# Patient Record
Sex: MALE | Race: OTHER | Employment: FULL TIME | URBAN - METROPOLITAN AREA
[De-identification: names, ages, dates, MRNs, and addresses within clinical notes are randomized per-mention and may not be internally consistent; named-entity substitution may affect disease eponyms.]

---

## 2018-09-13 PROBLEM — K51.20 ULCERATIVE PROCTITIS WITHOUT COMPLICATION (HCC): Status: ACTIVE | Noted: 2018-09-13

## 2018-09-13 PROBLEM — Z86.010 PERSONAL HISTORY OF COLONIC POLYPS: Status: ACTIVE | Noted: 2018-09-13

## 2018-09-13 PROBLEM — Z86.0100 PERSONAL HISTORY OF COLONIC POLYPS: Status: ACTIVE | Noted: 2018-09-13

## 2018-10-16 ENCOUNTER — NURSE ONLY (OUTPATIENT)
Dept: ENDOCRINOLOGY CLINIC | Facility: CLINIC | Age: 43
End: 2018-10-16
Payer: COMMERCIAL

## 2018-10-16 DIAGNOSIS — E11.65 TYPE 2 DIABETES MELLITUS WITH HYPERGLYCEMIA, WITHOUT LONG-TERM CURRENT USE OF INSULIN (HCC): Primary | ICD-10-CM

## 2018-10-16 PROCEDURE — G0108 DIAB MANAGE TRN  PER INDIV: HCPCS

## 2018-10-18 NOTE — PROGRESS NOTES
Lorena Walden  : 1975 attended Step 1 Diabetic Education:    Date: 10/18/2018   Start time: 6pm End time: 7pm    BP (P) 115/66   Ht (P) 71\"   Wt (P) 168 lb   BMI (P) 23.43 kg/m²     No results found for: A1C     Depression Screen -  1    Diabete

## 2018-10-31 ENCOUNTER — NURSE ONLY (OUTPATIENT)
Dept: ENDOCRINOLOGY CLINIC | Facility: CLINIC | Age: 43
End: 2018-10-31
Payer: COMMERCIAL

## 2018-10-31 VITALS — BODY MASS INDEX: 24 KG/M2 | WEIGHT: 171 LBS

## 2018-10-31 DIAGNOSIS — E11.65 TYPE 2 DIABETES MELLITUS WITH HYPERGLYCEMIA, WITHOUT LONG-TERM CURRENT USE OF INSULIN (HCC): Primary | ICD-10-CM

## 2018-10-31 PROCEDURE — G0109 DIAB MANAGE TRN IND/GROUP: HCPCS

## 2018-11-01 NOTE — PROGRESS NOTES
Michael Reid  [id number] attended Step 2 Diabetic Education:All done in Hungarian    Date: 11/1/2018  Start time: 2:30 End time: 3:30p      Diabetes Overview, pathophysiology, pre-diabetes, A1C results and treatment options for diabetes self-management,

## 2018-11-28 ENCOUNTER — NURSE ONLY (OUTPATIENT)
Dept: ENDOCRINOLOGY CLINIC | Facility: CLINIC | Age: 43
End: 2018-11-28
Payer: COMMERCIAL

## 2018-11-28 VITALS — BODY MASS INDEX: 24.08 KG/M2 | WEIGHT: 172 LBS | HEIGHT: 71 IN

## 2018-11-28 DIAGNOSIS — E11.65 TYPE 2 DIABETES MELLITUS WITH HYPERGLYCEMIA, WITHOUT LONG-TERM CURRENT USE OF INSULIN (HCC): Primary | ICD-10-CM

## 2018-11-28 PROCEDURE — G0108 DIAB MANAGE TRN  PER INDIV: HCPCS

## 2018-11-30 ENCOUNTER — OFFICE VISIT (OUTPATIENT)
Dept: INTERNAL MEDICINE CLINIC | Facility: CLINIC | Age: 43
End: 2018-11-30
Payer: COMMERCIAL

## 2018-11-30 VITALS
RESPIRATION RATE: 16 BRPM | BODY MASS INDEX: 25.14 KG/M2 | HEIGHT: 68.75 IN | TEMPERATURE: 98 F | DIASTOLIC BLOOD PRESSURE: 80 MMHG | HEART RATE: 68 BPM | SYSTOLIC BLOOD PRESSURE: 110 MMHG | WEIGHT: 169.75 LBS

## 2018-11-30 DIAGNOSIS — F39 MOOD DISORDER (HCC): ICD-10-CM

## 2018-11-30 DIAGNOSIS — F17.201 TOBACCO DEPENDENCE IN REMISSION: ICD-10-CM

## 2018-11-30 DIAGNOSIS — K51.20 ULCERATIVE PROCTITIS WITHOUT COMPLICATION (HCC): Primary | ICD-10-CM

## 2018-11-30 DIAGNOSIS — E11.9 TYPE 2 DIABETES MELLITUS WITHOUT COMPLICATION, WITHOUT LONG-TERM CURRENT USE OF INSULIN (HCC): ICD-10-CM

## 2018-11-30 PROCEDURE — 90686 IIV4 VACC NO PRSV 0.5 ML IM: CPT | Performed by: INTERNAL MEDICINE

## 2018-11-30 PROCEDURE — 99204 OFFICE O/P NEW MOD 45 MIN: CPT | Performed by: INTERNAL MEDICINE

## 2018-11-30 PROCEDURE — 90471 IMMUNIZATION ADMIN: CPT | Performed by: INTERNAL MEDICINE

## 2018-11-30 NOTE — PATIENT INSTRUCTIONS
John Williamson en rocio del comportamiento es [place]. Teléfono: [phone]. Póngase en contacto con rosanna si no tiene noticias de rosanna en los próximos 3 mina. Rosanna no habla español neva puedes ayudarte con recursos.

## 2018-11-30 NOTE — PROGRESS NOTES
Claudia Gutierrez is a 37year old male. HPI:   Patient presents to establish care. He has DM. Comes in with wife. 1. Ulcerative Proctitis: doing very well on mesalamine.  Denies diarrhea, constipation, melena, hematochezia, n/v.   2. DM: diagnosed in BMI 25.25 kg/m²   GENERAL: A&O well developed, well nourished,in no apparent distress  SKIN: no rashes,no suspicious lesions  HEENT: atraumatic, MMM, throat is clear  NECK: supple, no jvd, no thyromegaly  LUNGS: clear to auscultation bilateraly, no c/w/r patient is asked to return in 3 months for CPX.   Victorino Delgado MD

## 2018-12-10 ENCOUNTER — TELEPHONE (OUTPATIENT)
Dept: INTERNAL MEDICINE CLINIC | Facility: CLINIC | Age: 43
End: 2018-12-10

## 2019-01-07 DIAGNOSIS — E11.9 TYPE 2 DIABETES MELLITUS WITHOUT COMPLICATION, WITHOUT LONG-TERM CURRENT USE OF INSULIN (HCC): Primary | ICD-10-CM

## 2019-01-07 DIAGNOSIS — K51.20 ULCERATIVE PROCTITIS WITHOUT COMPLICATION (HCC): ICD-10-CM

## 2019-01-07 NOTE — TELEPHONE ENCOUNTER
Refill requested:   Requested Prescriptions     Pending Prescriptions Disp Refills   • MetFORMIN HCl 500 MG Oral Tab 90 tablet 0     Sig: Take 1 tablet (500 mg total) by mouth daily with breakfast.       Failed protocol    Diabetic Medication Protocol Fail Classes and/or appointments are held at the [facility] located at [address], [address]  If you need to make changes to your appointment or have questions, please call the Reece Martinez 's license/ID, the order for education from your doctor, any pertinent lab results and a list of medications patient is taking. Should you have questions about insurance coverage, call the [phone] number on the back of your insurance card.     Mar 01, 2

## 2019-01-09 NOTE — TELEPHONE ENCOUNTER
Spoke to pt and informed to complete fasting labs. Informed we have not received labs from previous PCP and Dr Jose Snowden is in need of labs to renew rx. Pt states will go tomorrow and needs refill to [facility].     Pt also looking for an appt for tomorrow for a c

## 2019-01-10 ENCOUNTER — TELEPHONE (OUTPATIENT)
Dept: INTERNAL MEDICINE CLINIC | Facility: CLINIC | Age: 44
End: 2019-01-10

## 2019-01-10 ENCOUNTER — OFFICE VISIT (OUTPATIENT)
Dept: INTERNAL MEDICINE CLINIC | Facility: CLINIC | Age: 44
End: 2019-01-10
Payer: COMMERCIAL

## 2019-01-10 VITALS
TEMPERATURE: 98 F | BODY MASS INDEX: 25.73 KG/M2 | RESPIRATION RATE: 16 BRPM | DIASTOLIC BLOOD PRESSURE: 80 MMHG | HEIGHT: 68.75 IN | HEART RATE: 64 BPM | SYSTOLIC BLOOD PRESSURE: 108 MMHG | WEIGHT: 173.75 LBS

## 2019-01-10 DIAGNOSIS — E66.3 OVERWEIGHT (BMI 25.0-29.9): ICD-10-CM

## 2019-01-10 DIAGNOSIS — J06.9 UPPER RESPIRATORY INFECTION, ACUTE: Primary | ICD-10-CM

## 2019-01-10 DIAGNOSIS — K51.20 ULCERATIVE PROCTITIS WITHOUT COMPLICATION (HCC): ICD-10-CM

## 2019-01-10 DIAGNOSIS — E11.9 TYPE 2 DIABETES MELLITUS WITHOUT COMPLICATION, WITHOUT LONG-TERM CURRENT USE OF INSULIN (HCC): ICD-10-CM

## 2019-01-10 PROCEDURE — 99214 OFFICE O/P EST MOD 30 MIN: CPT | Performed by: INTERNAL MEDICINE

## 2019-01-10 RX ORDER — CODEINE PHOSPHATE AND GUAIFENESIN 10; 100 MG/5ML; MG/5ML
5 SOLUTION ORAL NIGHTLY PRN
Qty: 180 ML | Refills: 0 | Status: SHIPPED | OUTPATIENT
Start: 2019-01-10 | End: 2019-02-07

## 2019-01-10 RX ORDER — AZITHROMYCIN 250 MG/1
TABLET, FILM COATED ORAL
Qty: 6 TABLET | Refills: 0 | Status: SHIPPED | OUTPATIENT
Start: 2019-01-10 | End: 2019-02-07

## 2019-01-10 NOTE — PATIENT INSTRUCTIONS
- Start cough syrup. This medication can make you drowsy, so take it at night only. - If you are not better in 2 days, start antibiotics (azithromycin). It was a pleasure seeing you in the clinic today.   Thank you for choosing the [facility]

## 2019-01-10 NOTE — TELEPHONE ENCOUNTER
PT needs letter from  of medical necessity for 10.16.18, 10.31.18 and 11.28.18 visits for Disease Management Ed.     Please call patient with any questions

## 2019-01-10 NOTE — PROGRESS NOTES
Yaneli Atkins is a 37year old male. HPI:   Patient presents with:  Cough: x 1 week. Has not tried anything OTC. Dtr and spouse sick at home. States cough sounds like there is some phlegm but he has not brought anything up.    Sore Throat  Patient presabdullahi Hyperlipidemia. Surgical:  has a past surgical history that includes colonoscopy and hernia surgery (1992). Family: family history includes Cancer in his mother, paternal grandmother, and sister; Diabetes in his mother.   Social:  reports that he quit smo On mesalamine. 4. Overweight (BMI 25.0-29.9)  Up 4 lbs from last visit. Focus on lifestyle modification.     Patient Care Team:  Sonya Clay MD as PCP - General (Family Medicine)  The patient indicates understanding of these issues and agrees to the

## 2019-01-11 NOTE — PROGRESS NOTES
Lorena Walden  :1975 attended Step 4 Diabetic Education:    Date: 2019 Start time: 4pm End time: 5pm      Ht 71\"   Wt 172 lb   BMI 23.99 kg/m²       No results found for: A1C   (HbA1c reference range:</= 7%)    Healthy Eating:  Accomplishm ndep.nih.gov   NDIC or National Diabetes Information Clearinghouse website: diabetes. niddk.nih.gov   Other:      Patient verbalized understanding and has no further questions at this time. Written materials provided for all areas covered.     Fide Dalton

## 2019-02-05 ENCOUNTER — APPOINTMENT (OUTPATIENT)
Dept: LAB | Age: 44
End: 2019-02-05
Attending: INTERNAL MEDICINE
Payer: COMMERCIAL

## 2019-02-05 ENCOUNTER — NURSE ONLY (OUTPATIENT)
Dept: ENDOCRINOLOGY CLINIC | Facility: CLINIC | Age: 44
End: 2019-02-05
Payer: COMMERCIAL

## 2019-02-05 VITALS — WEIGHT: 173 LBS | HEIGHT: 68.75 IN | BODY MASS INDEX: 25.62 KG/M2

## 2019-02-05 DIAGNOSIS — E11.9 TYPE 2 DIABETES MELLITUS WITHOUT COMPLICATION, WITHOUT LONG-TERM CURRENT USE OF INSULIN (HCC): ICD-10-CM

## 2019-02-05 DIAGNOSIS — E11.65 TYPE 2 DIABETES MELLITUS WITH HYPERGLYCEMIA, WITHOUT LONG-TERM CURRENT USE OF INSULIN (HCC): Primary | ICD-10-CM

## 2019-02-05 DIAGNOSIS — K51.20 ULCERATIVE PROCTITIS WITHOUT COMPLICATION (HCC): ICD-10-CM

## 2019-02-05 LAB
ALT SERPL-CCNC: 20 U/L (ref 17–63)
ANION GAP SERPL CALC-SCNC: 5 MMOL/L (ref 0–18)
AST SERPL-CCNC: 9 U/L (ref 15–41)
BUN BLD-MCNC: 13 MG/DL (ref 8–20)
BUN/CREAT SERPL: 13.7 (ref 10–20)
CALCIUM BLD-MCNC: 8.6 MG/DL (ref 8.3–10.3)
CHLORIDE SERPL-SCNC: 107 MMOL/L (ref 101–111)
CHOLEST SMN-MCNC: 108 MG/DL (ref ?–200)
CO2 SERPL-SCNC: 28 MMOL/L (ref 22–32)
CREAT BLD-MCNC: 0.95 MG/DL (ref 0.7–1.3)
CREAT UR-SCNC: 47.9 MG/DL
EST. AVERAGE GLUCOSE BLD GHB EST-MCNC: 137 MG/DL (ref 68–126)
GLUCOSE BLD-MCNC: 117 MG/DL (ref 70–99)
HBA1C MFR BLD HPLC: 6.4 % (ref ?–5.7)
HDLC SERPL-MCNC: 47 MG/DL (ref 40–59)
LDLC SERPL CALC-MCNC: 49 MG/DL (ref ?–100)
MICROALBUMIN UR-MCNC: <0.5 MG/DL
NONHDLC SERPL-MCNC: 61 MG/DL (ref ?–130)
OSMOLALITY SERPL CALC.SUM OF ELEC: 291 MOSM/KG (ref 275–295)
POTASSIUM SERPL-SCNC: 4.3 MMOL/L (ref 3.6–5.1)
SODIUM SERPL-SCNC: 140 MMOL/L (ref 136–144)
TRIGL SERPL-MCNC: 60 MG/DL (ref 30–149)
VLDLC SERPL CALC-MCNC: 12 MG/DL (ref 0–30)

## 2019-02-05 PROCEDURE — 80048 BASIC METABOLIC PNL TOTAL CA: CPT | Performed by: INTERNAL MEDICINE

## 2019-02-05 PROCEDURE — 84450 TRANSFERASE (AST) (SGOT): CPT | Performed by: INTERNAL MEDICINE

## 2019-02-05 PROCEDURE — 84460 ALANINE AMINO (ALT) (SGPT): CPT | Performed by: INTERNAL MEDICINE

## 2019-02-05 PROCEDURE — 36415 COLL VENOUS BLD VENIPUNCTURE: CPT | Performed by: INTERNAL MEDICINE

## 2019-02-05 PROCEDURE — G0108 DIAB MANAGE TRN  PER INDIV: HCPCS

## 2019-02-05 PROCEDURE — 82043 UR ALBUMIN QUANTITATIVE: CPT | Performed by: INTERNAL MEDICINE

## 2019-02-05 PROCEDURE — 83036 HEMOGLOBIN GLYCOSYLATED A1C: CPT | Performed by: INTERNAL MEDICINE

## 2019-02-05 PROCEDURE — 80061 LIPID PANEL: CPT | Performed by: INTERNAL MEDICINE

## 2019-02-05 PROCEDURE — 82570 ASSAY OF URINE CREATININE: CPT | Performed by: INTERNAL MEDICINE

## 2019-02-06 NOTE — PROGRESS NOTES
Colette Gonzalez  : 1975 was seen for Diabetic Education Follow up:    Date: 2019   Start time: 5:30p End time: 6pm    Assessment:     Assessment:  Ht 68.75\"   Wt 173 lb   BMI 25.73 kg/m²      HgbA1C (%)   Date Value   2019 6.4 (H) HEMOGLOBIN A1C   Result Value Ref Range    HgbA1C 6.4 (H) <5.7 %    Estimated Average Glucose 137 (H) 68 - 126 mg/dL   LIPID PANEL   Result Value Ref Range    Cholesterol, Total 108 <200 mg/dL    HDL Cholesterol 47 40 - 59 mg/dL    Triglycerides 60 30 -

## 2019-02-07 ENCOUNTER — OFFICE VISIT (OUTPATIENT)
Dept: INTERNAL MEDICINE CLINIC | Facility: CLINIC | Age: 44
End: 2019-02-07
Payer: COMMERCIAL

## 2019-02-07 VITALS
RESPIRATION RATE: 13 BRPM | HEIGHT: 68.75 IN | WEIGHT: 166 LBS | SYSTOLIC BLOOD PRESSURE: 112 MMHG | HEART RATE: 88 BPM | OXYGEN SATURATION: 97 % | BODY MASS INDEX: 24.59 KG/M2 | TEMPERATURE: 99 F | DIASTOLIC BLOOD PRESSURE: 80 MMHG

## 2019-02-07 DIAGNOSIS — R19.7 DIARRHEA, UNSPECIFIED TYPE: Primary | ICD-10-CM

## 2019-02-07 PROCEDURE — 99213 OFFICE O/P EST LOW 20 MIN: CPT | Performed by: NURSE PRACTITIONER

## 2019-02-07 NOTE — PROGRESS NOTES
Patient presents with:  Diarrhea: x1 day. has gone x6 times/ liquid stools    The patient complaints of abdominal cramping. Pain is located at Generalized. Pain is described as cramping. Severity is mild.  Associated symptoms: increased belching and diarrh SURGERY  1992      Family History   Problem Relation Age of Onset   • Diabetes Mother    • Cancer Mother         uterine cancer   • Cancer Sister         breast cancer   • Cancer Paternal Grandmother         ? cancer      Social History:  Social History may drink Pedialyte which is lower in sugar than Gatorade.    Check labs: Not at his time  Diagnostic tests: not at this time  Medications: imodium OTC, discussed mark with pt follow instructions on medication to use after stools    The patient indicates und

## 2019-02-07 NOTE — PATIENT INSTRUCTIONS
Loperamide oral suspension  Brand Name: Imodium A-D  ¿Qué es robinson medicamento? La LOPERAMIDA se utiliza para tratar [employer]. ¿Cómo corine Inavale Gracie?   [place]om robinson medicamento por vía oral. Siga las instrucciones de la etiqueta del medicam cimetidina claritromicina eritromicina gemfibrozil itraconazol ketoconazol quinidina quinina ranitidina ritonavir saquinavir  ¿Qué sucede si me olvido de maría dosis? No se aplica en robinson michell. Esta medicina no es para uso regular.  Yahoo solame Puede experimentar somnolencia o mareos. No conduzca, no utilice maquinaria ni akira nada que le exija permanecer en estado de alerta hasta que sepa cómo le afecta robinson medicamento.  No se siente ni se ponga de pie con rapidez, especialmente si es un pacient · Iona abundante cantidad de líquidos para evitar la deshidratación (pérdida excesiva de líquido). El agua, los caldos eulalia y las soluciones con electrolitos son [place] opciones.  Evite las bebidas alcohólicas, el café, el té y [address], ya que pueden irr

## 2019-03-01 ENCOUNTER — OFFICE VISIT (OUTPATIENT)
Dept: INTERNAL MEDICINE CLINIC | Facility: CLINIC | Age: 44
End: 2019-03-01
Payer: COMMERCIAL

## 2019-03-01 VITALS
HEART RATE: 78 BPM | HEIGHT: 68.75 IN | BODY MASS INDEX: 26.18 KG/M2 | DIASTOLIC BLOOD PRESSURE: 64 MMHG | RESPIRATION RATE: 14 BRPM | OXYGEN SATURATION: 98 % | TEMPERATURE: 98 F | SYSTOLIC BLOOD PRESSURE: 110 MMHG | WEIGHT: 176.75 LBS

## 2019-03-01 DIAGNOSIS — E11.9 TYPE 2 DIABETES MELLITUS WITHOUT COMPLICATION, WITHOUT LONG-TERM CURRENT USE OF INSULIN (HCC): ICD-10-CM

## 2019-03-01 DIAGNOSIS — Z00.00 ROUTINE GENERAL MEDICAL EXAMINATION AT A HEALTH CARE FACILITY: Primary | ICD-10-CM

## 2019-03-01 PROCEDURE — 90715 TDAP VACCINE 7 YRS/> IM: CPT | Performed by: INTERNAL MEDICINE

## 2019-03-01 PROCEDURE — 90471 IMMUNIZATION ADMIN: CPT | Performed by: INTERNAL MEDICINE

## 2019-03-01 PROCEDURE — 99396 PREV VISIT EST AGE 40-64: CPT | Performed by: INTERNAL MEDICINE

## 2019-03-01 RX ORDER — ATORVASTATIN CALCIUM 20 MG/1
20 TABLET, FILM COATED ORAL NIGHTLY
Qty: 90 TABLET | Refills: 3 | Status: SHIPPED | OUTPATIENT
Start: 2019-03-01 | End: 2020-03-05

## 2019-03-01 NOTE — PROGRESS NOTES
Divina Holland is a 37year old male. HPI:   Patient presents for physical exam.  1. DM: doing well on metformin. 2. Stress regarding wife's breast cancer. She has had surgery and completed 2 rounds of chemo.  He completed 3 sessions with counseling SURGERY  1992      Social History:    Social History    Tobacco Use      Smoking status: Former Smoker        Packs/day: 0.75        Years: 16.00        Pack years: 12        Types: Cigarettes        Start date: 2002        Quit date: 02/2018        Years 3 times. Felt it was helpful but things have improved. He has resources. # Health Maintenance: CPX on 3/1/2019  Stress Management: he is coping as above. Indication for ASA (50-57 yo): no indication at this time.    Colon Cancer Screening: Colonoscopy o

## 2019-03-01 NOTE — PATIENT INSTRUCTIONS
Por favor, repita delvin laboratorios en June de 2019. Por favor, véame en Septiember, 2019. Por favor, inicie atorvastatina para prevenir ataques cardíacos y accidentes cerebrovasculares.     Necesita 3 porciones de ocho onzas de calcio / vitamina D al

## 2019-04-26 ENCOUNTER — HOSPITAL ENCOUNTER (OUTPATIENT)
Dept: MRI IMAGING | Age: 44
Discharge: HOME | End: 2019-04-26
Attending: NURSE PRACTITIONER
Payer: COMMERCIAL

## 2019-04-26 DIAGNOSIS — K51.20 ULCERATIVE PROCTITIS WITHOUT COMPLICATION (HCC): ICD-10-CM

## 2019-04-26 DIAGNOSIS — K62.89 RECTAL PAIN: ICD-10-CM

## 2019-04-26 PROCEDURE — 72197 MRI PELVIS W/O & W/DYE: CPT | Performed by: NURSE PRACTITIONER

## 2019-04-26 PROCEDURE — A9575 INJ GADOTERATE MEGLUMI 0.1ML: HCPCS | Performed by: NURSE PRACTITIONER

## 2019-05-28 NOTE — TELEPHONE ENCOUNTER
Metformin 500 mg 1 tab daily filled 1-9-19 90 with 1 refill     LOV 3-1-19   return in 6 months for DM.    No upcoming apt in file  Labs 2-5-19   HgbA1C <5.7 % 6.4High

## 2019-09-03 ENCOUNTER — NURSE ONLY (OUTPATIENT)
Dept: ENDOCRINOLOGY CLINIC | Facility: CLINIC | Age: 44
End: 2019-09-03
Payer: COMMERCIAL

## 2019-09-03 VITALS — WEIGHT: 180 LBS | BODY MASS INDEX: 25.77 KG/M2 | HEIGHT: 70 IN

## 2019-09-03 DIAGNOSIS — E11.65 TYPE 2 DIABETES MELLITUS WITH HYPERGLYCEMIA, WITHOUT LONG-TERM CURRENT USE OF INSULIN (HCC): Primary | ICD-10-CM

## 2019-09-03 LAB
CARTRIDGE LOT#: 537 NUMERIC
HEMOGLOBIN A1C: 6.6 % (ref 4.3–5.6)

## 2019-09-03 PROCEDURE — 83036 HEMOGLOBIN GLYCOSYLATED A1C: CPT

## 2019-09-03 PROCEDURE — G0108 DIAB MANAGE TRN  PER INDIV: HCPCS

## 2019-09-04 NOTE — PROGRESS NOTES
Мария Jett  : 1975 was seen for Diabetic Education Follow up:    Date: 9/3/2019  Referring Provider: Kp Rojas  Start time: 6:30pm End time: 7pm    Assessment:     Assessment: Ht 70\"   Wt 180 lb   BMI 25.83 kg/m²      HEMOGLOBIN A1C (%)   D involvement/support encouraged  Depression and diabetes reviewed. Recommendations:      1. Follow recommended meal plan. 2. Test BG fasting and 2 hours post meals 2 times/day. 3. Bring glucose logs to all provider visits for review.    4. Encouraged

## 2019-09-05 ENCOUNTER — TELEPHONE (OUTPATIENT)
Dept: INTERNAL MEDICINE CLINIC | Facility: CLINIC | Age: 44
End: 2019-09-05

## 2019-09-05 NOTE — TELEPHONE ENCOUNTER
Patient calling because he received a phone message from our office regarding results; could not find in chart; please advise at  [phone]

## 2019-09-06 ENCOUNTER — TELEPHONE (OUTPATIENT)
Dept: INTERNAL MEDICINE CLINIC | Facility: CLINIC | Age: 44
End: 2019-09-06

## 2019-09-09 ENCOUNTER — APPOINTMENT (OUTPATIENT)
Dept: LAB | Age: 44
End: 2019-09-09
Attending: INTERNAL MEDICINE
Payer: COMMERCIAL

## 2019-09-09 DIAGNOSIS — Z00.00 ROUTINE GENERAL MEDICAL EXAMINATION AT A HEALTH CARE FACILITY: ICD-10-CM

## 2019-09-09 DIAGNOSIS — E11.65 TYPE 2 DIABETES MELLITUS WITH HYPERGLYCEMIA, WITHOUT LONG-TERM CURRENT USE OF INSULIN (HCC): ICD-10-CM

## 2019-09-09 DIAGNOSIS — E11.9 TYPE 2 DIABETES MELLITUS WITHOUT COMPLICATION, WITHOUT LONG-TERM CURRENT USE OF INSULIN (HCC): ICD-10-CM

## 2019-09-09 LAB
ALT SERPL-CCNC: 19 U/L (ref 16–61)
ANION GAP SERPL CALC-SCNC: 6 MMOL/L (ref 0–18)
AST SERPL-CCNC: 9 U/L (ref 15–37)
BUN BLD-MCNC: 14 MG/DL (ref 7–18)
BUN/CREAT SERPL: 17.9 (ref 10–20)
CALCIUM BLD-MCNC: 8.4 MG/DL (ref 8.5–10.1)
CHLORIDE SERPL-SCNC: 106 MMOL/L (ref 98–112)
CO2 SERPL-SCNC: 27 MMOL/L (ref 21–32)
CREAT BLD-MCNC: 0.78 MG/DL (ref 0.7–1.3)
EST. AVERAGE GLUCOSE BLD GHB EST-MCNC: 148 MG/DL (ref 68–126)
GLUCOSE BLD-MCNC: 108 MG/DL (ref 70–99)
HBA1C MFR BLD HPLC: 6.8 % (ref ?–5.7)
OSMOLALITY SERPL CALC.SUM OF ELEC: 289 MOSM/KG (ref 275–295)
POTASSIUM SERPL-SCNC: 3.7 MMOL/L (ref 3.5–5.1)
SODIUM SERPL-SCNC: 139 MMOL/L (ref 136–145)

## 2019-09-09 PROCEDURE — 36415 COLL VENOUS BLD VENIPUNCTURE: CPT | Performed by: INTERNAL MEDICINE

## 2019-09-09 PROCEDURE — 84460 ALANINE AMINO (ALT) (SGPT): CPT | Performed by: INTERNAL MEDICINE

## 2019-09-09 PROCEDURE — 83036 HEMOGLOBIN GLYCOSYLATED A1C: CPT | Performed by: INTERNAL MEDICINE

## 2019-09-09 PROCEDURE — 80048 BASIC METABOLIC PNL TOTAL CA: CPT | Performed by: INTERNAL MEDICINE

## 2019-09-09 PROCEDURE — 84450 TRANSFERASE (AST) (SGOT): CPT | Performed by: INTERNAL MEDICINE

## 2019-09-11 PROBLEM — S05.02XA CORNEAL ABRASION, LEFT, INITIAL ENCOUNTER: Status: ACTIVE | Noted: 2019-06-16

## 2019-09-11 PROBLEM — H20.9 TRAUMATIC IRITIS: Status: ACTIVE | Noted: 2019-06-16

## 2019-09-23 ENCOUNTER — APPOINTMENT (OUTPATIENT)
Dept: LAB | Age: 44
End: 2019-09-23
Attending: INTERNAL MEDICINE
Payer: COMMERCIAL

## 2019-09-23 ENCOUNTER — OFFICE VISIT (OUTPATIENT)
Dept: INTERNAL MEDICINE CLINIC | Facility: CLINIC | Age: 44
End: 2019-09-23
Payer: COMMERCIAL

## 2019-09-23 VITALS
HEART RATE: 63 BPM | WEIGHT: 179.75 LBS | RESPIRATION RATE: 18 BRPM | TEMPERATURE: 98 F | DIASTOLIC BLOOD PRESSURE: 64 MMHG | BODY MASS INDEX: 25.73 KG/M2 | OXYGEN SATURATION: 99 % | HEIGHT: 70 IN | SYSTOLIC BLOOD PRESSURE: 122 MMHG

## 2019-09-23 DIAGNOSIS — E83.51 HYPOCALCEMIA: Primary | ICD-10-CM

## 2019-09-23 DIAGNOSIS — E11.9 TYPE 2 DIABETES MELLITUS WITHOUT COMPLICATION, WITHOUT LONG-TERM CURRENT USE OF INSULIN (HCC): ICD-10-CM

## 2019-09-23 DIAGNOSIS — K51.20 ULCERATIVE PROCTITIS WITHOUT COMPLICATION (HCC): ICD-10-CM

## 2019-09-23 DIAGNOSIS — E83.51 HYPOCALCEMIA: ICD-10-CM

## 2019-09-23 PROCEDURE — 90471 IMMUNIZATION ADMIN: CPT | Performed by: INTERNAL MEDICINE

## 2019-09-23 PROCEDURE — 36415 COLL VENOUS BLD VENIPUNCTURE: CPT | Performed by: INTERNAL MEDICINE

## 2019-09-23 PROCEDURE — 90686 IIV4 VACC NO PRSV 0.5 ML IM: CPT | Performed by: INTERNAL MEDICINE

## 2019-09-23 PROCEDURE — 82330 ASSAY OF CALCIUM: CPT | Performed by: INTERNAL MEDICINE

## 2019-09-23 PROCEDURE — 99214 OFFICE O/P EST MOD 30 MIN: CPT | Performed by: INTERNAL MEDICINE

## 2019-09-23 NOTE — PROGRESS NOTES
Cece Drew is a 53year old male. HPI:   Patient presents for DM. 1. Ulcerative proctotitis: tolerating mesalamine. Has mild, intermittent rectal pain. Saw GI on 9/11. Currently denies any diarrhea or BRBPR. 2. DM: tolerating metformin.  Measure • COLONOSCOPY     • HERNIA SURGERY  1992      Social History:    Social History    Tobacco Use      Smoking status: Former Smoker        Packs/day: 0.75        Years: 16.00        Pack years: 12        Types: Cigarettes        Start date: 2002        Lisa Felix morning to help with adherence.   - BP: normotensive without medications  - micro alb:creat < 30 in 2/2019  - Depression Screen:  - not on ASA  # Health Maintenance: CPX on 3/1/2019  Stress Management: he is coping as above.    Indication for ASA (50-57 yo)

## 2019-09-25 LAB
CALCIUM IONIZED PH 7.4: 1.18 MMOL/L
CALCIUM IONIZED, SERUM: 1.2 MMOL/L

## 2019-10-10 NOTE — TELEPHONE ENCOUNTER
METFORMIN  MG     Last OV relevant to medication: 9-     Last refill date: 5- # 90 tabs with 1 refills     When pt was asked to return for OV: 6 months     Upcoming appt/reason: 3-    Labs: 9-9-2019- a1c     Diabetes: controlled

## 2019-12-02 ENCOUNTER — HOSPITAL ENCOUNTER (EMERGENCY)
Facility: HOSPITAL | Age: 44
Discharge: HOME | End: 2019-12-02
Attending: EMERGENCY MEDICINE
Payer: COMMERCIAL

## 2019-12-02 ENCOUNTER — APPOINTMENT (OUTPATIENT)
Dept: ULTRASOUND IMAGING | Facility: HOSPITAL | Age: 44
End: 2019-12-02
Attending: EMERGENCY MEDICINE
Payer: COMMERCIAL

## 2019-12-02 ENCOUNTER — HOSPITAL ENCOUNTER (OUTPATIENT)
Age: 44
Discharge: ANOTHER HOSPITAL | End: 2019-12-02
Attending: FAMILY MEDICINE
Payer: COMMERCIAL

## 2019-12-02 VITALS
OXYGEN SATURATION: 98 % | BODY MASS INDEX: 25.2 KG/M2 | DIASTOLIC BLOOD PRESSURE: 88 MMHG | HEART RATE: 51 BPM | WEIGHT: 180 LBS | HEIGHT: 71 IN | SYSTOLIC BLOOD PRESSURE: 123 MMHG | TEMPERATURE: 98 F | RESPIRATION RATE: 17 BRPM

## 2019-12-02 VITALS
DIASTOLIC BLOOD PRESSURE: 80 MMHG | TEMPERATURE: 99 F | HEART RATE: 60 BPM | OXYGEN SATURATION: 96 % | RESPIRATION RATE: 18 BRPM | SYSTOLIC BLOOD PRESSURE: 122 MMHG

## 2019-12-02 DIAGNOSIS — K29.00 ACUTE GASTRITIS WITHOUT HEMORRHAGE, UNSPECIFIED GASTRITIS TYPE: Primary | ICD-10-CM

## 2019-12-02 DIAGNOSIS — R10.13 ACUTE EPIGASTRIC PAIN: Primary | ICD-10-CM

## 2019-12-02 PROCEDURE — 99204 OFFICE O/P NEW MOD 45 MIN: CPT

## 2019-12-02 PROCEDURE — 99284 EMERGENCY DEPT VISIT MOD MDM: CPT

## 2019-12-02 PROCEDURE — 80053 COMPREHEN METABOLIC PANEL: CPT | Performed by: EMERGENCY MEDICINE

## 2019-12-02 PROCEDURE — 85025 COMPLETE CBC W/AUTO DIFF WBC: CPT | Performed by: EMERGENCY MEDICINE

## 2019-12-02 PROCEDURE — 93005 ELECTROCARDIOGRAM TRACING: CPT

## 2019-12-02 PROCEDURE — 81002 URINALYSIS NONAUTO W/O SCOPE: CPT | Performed by: FAMILY MEDICINE

## 2019-12-02 PROCEDURE — 93010 ELECTROCARDIOGRAM REPORT: CPT

## 2019-12-02 PROCEDURE — 36415 COLL VENOUS BLD VENIPUNCTURE: CPT

## 2019-12-02 PROCEDURE — 76700 US EXAM ABDOM COMPLETE: CPT | Performed by: EMERGENCY MEDICINE

## 2019-12-02 PROCEDURE — 99214 OFFICE O/P EST MOD 30 MIN: CPT

## 2019-12-02 PROCEDURE — 83690 ASSAY OF LIPASE: CPT | Performed by: EMERGENCY MEDICINE

## 2019-12-02 RX ORDER — MAGNESIUM HYDROXIDE/ALUMINUM HYDROXICE/SIMETHICONE 120; 1200; 1200 MG/30ML; MG/30ML; MG/30ML
30 SUSPENSION ORAL ONCE
Status: COMPLETED | OUTPATIENT
Start: 2019-12-02 | End: 2019-12-02

## 2019-12-02 RX ORDER — LIDOCAINE HYDROCHLORIDE 20 MG/ML
10 SOLUTION OROPHARYNGEAL ONCE
Status: COMPLETED | OUTPATIENT
Start: 2019-12-02 | End: 2019-12-02

## 2019-12-02 RX ORDER — NICOTINE POLACRILEX 4 MG/1
20 GUM, CHEWING ORAL DAILY
Qty: 30 TABLET | Refills: 0 | Status: SHIPPED | OUTPATIENT
Start: 2019-12-02 | End: 2020-01-01

## 2019-12-03 NOTE — ED INITIAL ASSESSMENT (HPI)
Patient with epigastric pain since Friday. Pain comes and goes. Patient sent here from [facility]. Patient states he feels more fatigued, nausea. Denies SOB, vomiting and diarrhea.

## 2019-12-03 NOTE — ED PROVIDER NOTES
Patient Seen in: [facility]      History   Patient presents with:  Abdominal Pain    Stated Complaint: UPPER ABDOMINAL PAIN/NAUSEA X 3 DAYS    HPI    58-year-old male who presents with complaints of epigastric abdominal pain, ri range of motion,  Lungs clear to auscultation bilaterally  Heart S1-S2 heard no murmurs no gallops  Skin shows no rash  Abdomen is soft, mild epigastric abdominal tenderness appreciated otherwise rest of the abdomen is soft nontender no guarding no rigidit

## 2020-02-23 ENCOUNTER — HOSPITAL ENCOUNTER (OUTPATIENT)
Age: 45
Discharge: HOME | End: 2020-02-23
Attending: FAMILY MEDICINE
Payer: COMMERCIAL

## 2020-02-23 ENCOUNTER — APPOINTMENT (OUTPATIENT)
Dept: GENERAL RADIOLOGY | Age: 45
End: 2020-02-23
Attending: FAMILY MEDICINE
Payer: COMMERCIAL

## 2020-02-23 VITALS
HEART RATE: 66 BPM | TEMPERATURE: 98 F | RESPIRATION RATE: 16 BRPM | OXYGEN SATURATION: 96 % | BODY MASS INDEX: 26 KG/M2 | WEIGHT: 184 LBS | SYSTOLIC BLOOD PRESSURE: 105 MMHG | DIASTOLIC BLOOD PRESSURE: 65 MMHG

## 2020-02-23 DIAGNOSIS — J06.9 UPPER RESPIRATORY TRACT INFECTION, UNSPECIFIED TYPE: ICD-10-CM

## 2020-02-23 DIAGNOSIS — J40 BRONCHITIS: Primary | ICD-10-CM

## 2020-02-23 PROCEDURE — 99214 OFFICE O/P EST MOD 30 MIN: CPT

## 2020-02-23 PROCEDURE — 71046 X-RAY EXAM CHEST 2 VIEWS: CPT | Performed by: FAMILY MEDICINE

## 2020-02-23 PROCEDURE — 99213 OFFICE O/P EST LOW 20 MIN: CPT

## 2020-02-23 RX ORDER — BENZONATATE 200 MG/1
200 CAPSULE ORAL 3 TIMES DAILY PRN
Qty: 30 CAPSULE | Refills: 0 | Status: SHIPPED | OUTPATIENT
Start: 2020-02-23 | End: 2020-03-04

## 2020-02-23 RX ORDER — METHYLPREDNISOLONE 4 MG/1
TABLET ORAL
Qty: 1 PACKAGE | Refills: 0 | Status: SHIPPED | OUTPATIENT
Start: 2020-02-23 | End: 2020-03-09 | Stop reason: ALTCHOICE

## 2020-02-25 NOTE — ED PROVIDER NOTES
Patient Seen in: [facility]      History   Patient presents with:  Cough/URI  Sore Throat    Stated Complaint: COUGH/ SORE THROAT X 5 DAYS    HPI    53year old male with history of Hypertension and Diabetes presents for sore th canal and external ear normal.      Left Ear: Hearing, tympanic membrane, ear canal and external ear normal.      Nose: Nose normal.      Mouth/Throat:      Pharynx: Uvula midline.    Eyes:      General: Lids are normal.      Conjunctiva/sclera: Conjunctiva

## 2020-03-04 DIAGNOSIS — Z00.00 ROUTINE GENERAL MEDICAL EXAMINATION AT A HEALTH CARE FACILITY: ICD-10-CM

## 2020-03-04 DIAGNOSIS — E11.9 TYPE 2 DIABETES MELLITUS WITHOUT COMPLICATION, WITHOUT LONG-TERM CURRENT USE OF INSULIN (HCC): ICD-10-CM

## 2020-03-05 RX ORDER — ATORVASTATIN CALCIUM 20 MG/1
TABLET, FILM COATED ORAL
Qty: 90 TABLET | Refills: 0 | Status: SHIPPED | OUTPATIENT
Start: 2020-03-05 | End: 2020-05-29

## 2020-03-05 NOTE — TELEPHONE ENCOUNTER
Last OV: 9/23/19 with Dr. Daily Hirsch  Last refill date: 3/1/19     #/refills: #90, 3 refills  When pt was asked to return for OV: 6 months  Upcoming appt: 3/9/2020 with Dr. Robert Goldstein 3/23/2020 with Dr. Daily Hirsch  Last labs 12/2/19   Last lipid 2/5/19

## 2020-03-05 NOTE — TELEPHONE ENCOUNTER
1. Please ask him to complete his labs asap so I can discuss everything with him at upcoming [address].

## 2020-03-09 ENCOUNTER — OFFICE VISIT (OUTPATIENT)
Dept: INTERNAL MEDICINE CLINIC | Facility: CLINIC | Age: 45
End: 2020-03-09
Payer: COMMERCIAL

## 2020-03-09 ENCOUNTER — HOSPITAL ENCOUNTER (OUTPATIENT)
Dept: GENERAL RADIOLOGY | Age: 45
Discharge: HOME | End: 2020-03-09
Attending: INTERNAL MEDICINE
Payer: COMMERCIAL

## 2020-03-09 VITALS
HEART RATE: 86 BPM | HEIGHT: 71 IN | RESPIRATION RATE: 16 BRPM | SYSTOLIC BLOOD PRESSURE: 124 MMHG | DIASTOLIC BLOOD PRESSURE: 76 MMHG | WEIGHT: 184.5 LBS | TEMPERATURE: 98 F | OXYGEN SATURATION: 98 % | BODY MASS INDEX: 25.83 KG/M2

## 2020-03-09 DIAGNOSIS — M79.605 ACUTE LEG PAIN, LEFT: Primary | ICD-10-CM

## 2020-03-09 DIAGNOSIS — M79.605 ACUTE LEG PAIN, LEFT: ICD-10-CM

## 2020-03-09 PROCEDURE — 99214 OFFICE O/P EST MOD 30 MIN: CPT | Performed by: INTERNAL MEDICINE

## 2020-03-09 PROCEDURE — 73590 X-RAY EXAM OF LOWER LEG: CPT | Performed by: INTERNAL MEDICINE

## 2020-03-09 NOTE — TELEPHONE ENCOUNTER
Patient calling for message left for him, advised note in chart per MA; patient understands and will get labs completed. He is still keeping appointment for pain in feet this afternoon.

## 2020-03-09 NOTE — TELEPHONE ENCOUNTER
Call placed to patient to inform of message below.     LM for patient    Please inform to have labs completed before OV

## 2020-03-09 NOTE — PATIENT INSTRUCTIONS
- Revisaremos maría radiografía de la parte inferior de la pierna. - Si es normal, le recetaremos antiinflamatorios.  - Si es anormal, lo derivaremos a un especialista en ortopedia. It was a pleasure seeing you in the clinic today.   Thank you for daniel

## 2020-03-09 NOTE — PROGRESS NOTES
Tiana Hurtado is a 53year old male. HPI:   Patient presents with:  Leg Pain: L lower pain, Pt doesn't have pain right now. Pt states he has burning pain when pressure is applied. Pt states it started december.      Patient presents with an acute muscul about 2 years ago. His smoking use included cigarettes. He started smoking about 18 years ago. He has a 12.00 pack-year smoking history. He has never used smokeless tobacco. He reports current alcohol use. He reports that he does not use drugs.   Wt [place] indicates understanding of these issues and agrees to the plan. The patient is asked to return to clinic later this month as previously scheduled with Dr. Shannon Barajas MD for follow up on chronic issues.     Lorena Mayo MD

## 2020-03-10 RX ORDER — MELOXICAM 7.5 MG/1
7.5 TABLET ORAL DAILY
Qty: 30 TABLET | Refills: 0 | Status: SHIPPED | OUTPATIENT
Start: 2020-03-10 | End: 2020-05-04

## 2020-05-04 ENCOUNTER — VIRTUAL PHONE E/M (OUTPATIENT)
Dept: INTERNAL MEDICINE CLINIC | Facility: CLINIC | Age: 45
End: 2020-05-04
Payer: COMMERCIAL

## 2020-05-04 DIAGNOSIS — E11.9 TYPE 2 DIABETES MELLITUS WITHOUT COMPLICATION, WITHOUT LONG-TERM CURRENT USE OF INSULIN (HCC): ICD-10-CM

## 2020-05-04 DIAGNOSIS — M79.605 BILATERAL LEG PAIN: ICD-10-CM

## 2020-05-04 DIAGNOSIS — M79.604 BILATERAL LEG PAIN: ICD-10-CM

## 2020-05-04 DIAGNOSIS — K51.20 ULCERATIVE PROCTITIS WITHOUT COMPLICATION (HCC): Primary | ICD-10-CM

## 2020-05-04 DIAGNOSIS — E11.69 HYPERLIPIDEMIA ASSOCIATED WITH TYPE 2 DIABETES MELLITUS (HCC): ICD-10-CM

## 2020-05-04 DIAGNOSIS — E78.5 HYPERLIPIDEMIA ASSOCIATED WITH TYPE 2 DIABETES MELLITUS (HCC): ICD-10-CM

## 2020-05-04 PROCEDURE — 99214 OFFICE O/P EST MOD 30 MIN: CPT | Performed by: INTERNAL MEDICINE

## 2020-05-04 NOTE — PROGRESS NOTES
Virtual Telephone Check-In    Ric Marte verbally consents to a Virtual/Telephone Check-In visit on 05/04/20. Patient understands and accepts financial responsibility for any deductible, co-insurance and/or co-pays associated with this service.   Te [id number]  1. DM: tolerating metformin. He has been measuring fasting blood sugars every day for 1 week. They have been < 130s. Occasionally checks post-prandial blood sugars which have been < 180.   2. Ulcerative Colitis: no flares since 9/2019.  Doing well o • Cancer Paternal Grandmother         ? cancer      Past Medical History:   Diagnosis Date   • Diabetes ([facility] [place] [address]    • Diabetes mellitus ([facility] [place] [address]    • Essential hypertension    • Hyperlipidemia       Past Surgical History:   Procedure Laterality Date   • COLONO malignancy.   Bone Health/Fall Prevention ([place]): educated on dietary calcium/vit D3, weight bearing exrercises  Abdominal u/s: (any M >65 smoker/smoking): screen at age 72  Lung Cancer Screening: (54 to 76 years, a history of smoking at least 30 pack-ye

## 2020-05-05 ENCOUNTER — LAB ENCOUNTER (OUTPATIENT)
Dept: LAB | Age: 45
End: 2020-05-05
Attending: INTERNAL MEDICINE
Payer: COMMERCIAL

## 2020-05-05 DIAGNOSIS — K51.20 ULCERATIVE PROCTITIS WITHOUT COMPLICATION (HCC): ICD-10-CM

## 2020-05-05 DIAGNOSIS — E11.9 TYPE 2 DIABETES MELLITUS WITHOUT COMPLICATION, WITHOUT LONG-TERM CURRENT USE OF INSULIN (HCC): ICD-10-CM

## 2020-05-05 PROCEDURE — 80061 LIPID PANEL: CPT | Performed by: INTERNAL MEDICINE

## 2020-05-05 PROCEDURE — 36415 COLL VENOUS BLD VENIPUNCTURE: CPT | Performed by: INTERNAL MEDICINE

## 2020-05-05 PROCEDURE — 83036 HEMOGLOBIN GLYCOSYLATED A1C: CPT | Performed by: INTERNAL MEDICINE

## 2020-05-05 PROCEDURE — 82570 ASSAY OF URINE CREATININE: CPT | Performed by: INTERNAL MEDICINE

## 2020-05-05 PROCEDURE — 82043 UR ALBUMIN QUANTITATIVE: CPT | Performed by: INTERNAL MEDICINE

## 2020-05-05 PROCEDURE — 84450 TRANSFERASE (AST) (SGOT): CPT | Performed by: INTERNAL MEDICINE

## 2020-05-05 PROCEDURE — 84460 ALANINE AMINO (ALT) (SGPT): CPT | Performed by: INTERNAL MEDICINE

## 2020-05-05 PROCEDURE — 85025 COMPLETE CBC W/AUTO DIFF WBC: CPT | Performed by: INTERNAL MEDICINE

## 2020-05-05 PROCEDURE — 80048 BASIC METABOLIC PNL TOTAL CA: CPT | Performed by: INTERNAL MEDICINE

## 2020-05-06 DIAGNOSIS — E83.51 HYPOCALCEMIA: ICD-10-CM

## 2020-05-06 DIAGNOSIS — E11.9 TYPE 2 DIABETES MELLITUS WITHOUT COMPLICATION, WITHOUT LONG-TERM CURRENT USE OF INSULIN (HCC): Primary | ICD-10-CM

## 2020-05-28 DIAGNOSIS — Z00.00 ROUTINE GENERAL MEDICAL EXAMINATION AT A HEALTH CARE FACILITY: ICD-10-CM

## 2020-05-28 DIAGNOSIS — E11.9 TYPE 2 DIABETES MELLITUS WITHOUT COMPLICATION, WITHOUT LONG-TERM CURRENT USE OF INSULIN (HCC): ICD-10-CM

## 2020-05-29 RX ORDER — ATORVASTATIN CALCIUM 20 MG/1
TABLET, FILM COATED ORAL
Qty: 90 TABLET | Refills: 0 | Status: SHIPPED | OUTPATIENT
Start: 2020-05-29 | End: 2020-08-23

## 2020-05-29 NOTE — TELEPHONE ENCOUNTER
Refill requested:   Requested Prescriptions     Pending Prescriptions Disp Refills   • ATORVASTATIN 20 MG Oral Tab [Pharmacy Med Name: ATORVASTATIN  20MG  TAB] 90 tablet 0     Sig: TOME 1 TABLETA POR LA THALIA HATFIELD         Last refill: 3-5-2020# 90 ta

## 2020-06-05 NOTE — TELEPHONE ENCOUNTER
Appointment scheduled    Future Appointments   Date Time Provider Eli Albert   8/10/2020  3:00 PM Cody Mcdonald MD [facility] [place]

## 2020-08-10 ENCOUNTER — OFFICE VISIT (OUTPATIENT)
Dept: INTERNAL MEDICINE CLINIC | Facility: CLINIC | Age: 45
End: 2020-08-10
Payer: COMMERCIAL

## 2020-08-10 VITALS
HEIGHT: 71 IN | RESPIRATION RATE: 12 BRPM | HEART RATE: 78 BPM | BODY MASS INDEX: 24.92 KG/M2 | SYSTOLIC BLOOD PRESSURE: 122 MMHG | TEMPERATURE: 98 F | OXYGEN SATURATION: 100 % | DIASTOLIC BLOOD PRESSURE: 62 MMHG | WEIGHT: 178 LBS

## 2020-08-10 DIAGNOSIS — Z00.00 ROUTINE GENERAL MEDICAL EXAMINATION AT A HEALTH CARE FACILITY: Primary | ICD-10-CM

## 2020-08-10 DIAGNOSIS — L60.9 FINGERNAIL ABNORMALITIES: ICD-10-CM

## 2020-08-10 DIAGNOSIS — E11.9 TYPE 2 DIABETES MELLITUS WITHOUT COMPLICATION, WITHOUT LONG-TERM CURRENT USE OF INSULIN (HCC): ICD-10-CM

## 2020-08-10 DIAGNOSIS — E78.5 HYPERLIPIDEMIA ASSOCIATED WITH TYPE 2 DIABETES MELLITUS (HCC): ICD-10-CM

## 2020-08-10 DIAGNOSIS — E11.69 HYPERLIPIDEMIA ASSOCIATED WITH TYPE 2 DIABETES MELLITUS (HCC): ICD-10-CM

## 2020-08-10 PROCEDURE — 3008F BODY MASS INDEX DOCD: CPT | Performed by: INTERNAL MEDICINE

## 2020-08-10 PROCEDURE — 99396 PREV VISIT EST AGE 40-64: CPT | Performed by: INTERNAL MEDICINE

## 2020-08-10 PROCEDURE — 3074F SYST BP LT 130 MM HG: CPT | Performed by: INTERNAL MEDICINE

## 2020-08-10 PROCEDURE — 3078F DIAST BP <80 MM HG: CPT | Performed by: INTERNAL MEDICINE

## 2020-08-10 NOTE — PROGRESS NOTES
Alisa Melchor is a 50year old male. HPI:   Patient called for a physical exam.   ID # [id number]  3. Leg pain: he did stop the atorvastatin for 6 weeks which did improve his leg pain. But then he resumed it w/o notifying me.  However, stat hypertension    • Hyperlipidemia       Past Surgical History:   Procedure Laterality Date   • COLONOSCOPY     • HERNIA SURGERY  1992      Social History:    Social History    Tobacco Use      Smoking status: Former Smoker        Packs/day: 0.75        Year 7/16/2019 by [facility] Dr. Levon Cook did not show diabetic retinopathy in either eye.  Reminded he is due.   - Foot Exam: 8/10/2020, continue nightly foot exams  - statin therapy as above  - BP: normotensive without medications  - micro al

## 2020-08-10 NOTE — PATIENT INSTRUCTIONS
Debe realizarse el examen de la vista para diabéticos lo antes posible. Debes ir a tus laboratorios. Llame a programación central al [phone]. Debes realizar los laboratorios en ÅMSELE de 2020.     Te aconsejo que te pongas la vacuna anual con

## 2020-08-21 DIAGNOSIS — E11.9 TYPE 2 DIABETES MELLITUS WITHOUT COMPLICATION, WITHOUT LONG-TERM CURRENT USE OF INSULIN (HCC): ICD-10-CM

## 2020-08-21 DIAGNOSIS — Z00.00 ROUTINE GENERAL MEDICAL EXAMINATION AT A HEALTH CARE FACILITY: ICD-10-CM

## 2020-08-23 RX ORDER — ATORVASTATIN CALCIUM 20 MG/1
TABLET, FILM COATED ORAL
Qty: 90 TABLET | Refills: 0 | Status: SHIPPED | OUTPATIENT
Start: 2020-08-23 | End: 2020-11-11

## 2020-09-14 NOTE — TELEPHONE ENCOUNTER
MetFORMIN 500MG TABLET  Protocol Passed     LOV: 8/10/2020   # Diabetes: uncontrolled in 5/2020 but has made nutritional changes since then.  Repeat A1C in 11/2020.   - Cont metformin 500 mg once daily   RTC: 6 months for DM   Upcoming OV: 11/13/2020   Fill

## 2020-09-17 ENCOUNTER — LAB ENCOUNTER (OUTPATIENT)
Dept: LAB | Age: 45
End: 2020-09-17
Attending: NURSE PRACTITIONER
Payer: COMMERCIAL

## 2020-09-17 DIAGNOSIS — K51.20 ULCERATIVE PROCTITIS WITHOUT COMPLICATION (HCC): ICD-10-CM

## 2020-09-17 LAB
ALBUMIN SERPL-MCNC: 4.1 G/DL (ref 3.4–5)
ALBUMIN/GLOB SERPL: 1.4 {RATIO} (ref 1–2)
ALP LIVER SERPL-CCNC: 109 U/L (ref 45–117)
ALT SERPL-CCNC: 20 U/L (ref 16–61)
ANION GAP SERPL CALC-SCNC: 4 MMOL/L (ref 0–18)
AST SERPL-CCNC: 8 U/L (ref 15–37)
BASOPHILS # BLD AUTO: 0.04 X10(3) UL (ref 0–0.2)
BASOPHILS NFR BLD AUTO: 0.7 %
BILIRUB SERPL-MCNC: 0.5 MG/DL (ref 0.1–2)
BUN BLD-MCNC: 14 MG/DL (ref 7–18)
BUN/CREAT SERPL: 14.6 (ref 10–20)
CALCIUM BLD-MCNC: 8.8 MG/DL (ref 8.5–10.1)
CHLORIDE SERPL-SCNC: 109 MMOL/L (ref 98–112)
CO2 SERPL-SCNC: 26 MMOL/L (ref 21–32)
CREAT BLD-MCNC: 0.96 MG/DL (ref 0.7–1.3)
CRP SERPL-MCNC: <0.29 MG/DL (ref ?–0.3)
DEPRECATED RDW RBC AUTO: 43.4 FL (ref 35.1–46.3)
EOSINOPHIL # BLD AUTO: 0.23 X10(3) UL (ref 0–0.7)
EOSINOPHIL NFR BLD AUTO: 3.8 %
ERYTHROCYTE [DISTWIDTH] IN BLOOD BY AUTOMATED COUNT: 13.2 % (ref 11–15)
GLOBULIN PLAS-MCNC: 3 G/DL (ref 2.8–4.4)
GLUCOSE BLD-MCNC: 137 MG/DL (ref 70–99)
HCT VFR BLD AUTO: 46.9 % (ref 39–53)
HGB BLD-MCNC: 15.7 G/DL (ref 13–17.5)
IMM GRANULOCYTES # BLD AUTO: 0.04 X10(3) UL (ref 0–1)
IMM GRANULOCYTES NFR BLD: 0.7 %
LYMPHOCYTES # BLD AUTO: 2.41 X10(3) UL (ref 1–4)
LYMPHOCYTES NFR BLD AUTO: 40.3 %
M PROTEIN MFR SERPL ELPH: 7.1 G/DL (ref 6.4–8.2)
MCH RBC QN AUTO: 29.8 PG (ref 26–34)
MCHC RBC AUTO-ENTMCNC: 33.5 G/DL (ref 31–37)
MCV RBC AUTO: 89.2 FL (ref 80–100)
MONOCYTES # BLD AUTO: 0.35 X10(3) UL (ref 0.1–1)
MONOCYTES NFR BLD AUTO: 5.9 %
NEUTROPHILS # BLD AUTO: 2.91 X10 (3) UL (ref 1.5–7.7)
NEUTROPHILS # BLD AUTO: 2.91 X10(3) UL (ref 1.5–7.7)
NEUTROPHILS NFR BLD AUTO: 48.6 %
OSMOLALITY SERPL CALC.SUM OF ELEC: 291 MOSM/KG (ref 275–295)
PATIENT FASTING Y/N/NP: YES
PLATELET # BLD AUTO: 254 10(3)UL (ref 150–450)
POTASSIUM SERPL-SCNC: 3.9 MMOL/L (ref 3.5–5.1)
RBC # BLD AUTO: 5.26 X10(6)UL (ref 4.3–5.7)
SED RATE-ML: 5 MM/HR (ref 0–12)
SODIUM SERPL-SCNC: 139 MMOL/L (ref 136–145)
WBC # BLD AUTO: 6 X10(3) UL (ref 4–11)

## 2020-09-17 PROCEDURE — 85652 RBC SED RATE AUTOMATED: CPT

## 2020-09-17 PROCEDURE — 86140 C-REACTIVE PROTEIN: CPT

## 2020-09-17 PROCEDURE — 36415 COLL VENOUS BLD VENIPUNCTURE: CPT

## 2020-09-17 PROCEDURE — 85025 COMPLETE CBC W/AUTO DIFF WBC: CPT

## 2020-09-17 PROCEDURE — 80053 COMPREHEN METABOLIC PANEL: CPT

## 2020-09-22 ENCOUNTER — HOSPITAL ENCOUNTER (OUTPATIENT)
Dept: BONE DENSITY | Age: 45
Discharge: HOME | End: 2020-09-22
Attending: NURSE PRACTITIONER
Payer: COMMERCIAL

## 2020-09-22 DIAGNOSIS — E83.51 HYPOCALCEMIA: ICD-10-CM

## 2020-09-22 PROCEDURE — 77080 DXA BONE DENSITY AXIAL: CPT | Performed by: NURSE PRACTITIONER

## 2020-11-10 DIAGNOSIS — E11.9 TYPE 2 DIABETES MELLITUS WITHOUT COMPLICATION, WITHOUT LONG-TERM CURRENT USE OF INSULIN (HCC): ICD-10-CM

## 2020-11-10 DIAGNOSIS — Z00.00 ROUTINE GENERAL MEDICAL EXAMINATION AT A HEALTH CARE FACILITY: ICD-10-CM

## 2020-11-11 RX ORDER — ATORVASTATIN CALCIUM 20 MG/1
20 TABLET, FILM COATED ORAL NIGHTLY
Qty: 90 TABLET | Refills: 0 | Status: SHIPPED | OUTPATIENT
Start: 2020-11-11 | End: 2021-01-25

## 2020-11-12 ENCOUNTER — TELEPHONE (OUTPATIENT)
Dept: INTERNAL MEDICINE CLINIC | Facility: CLINIC | Age: 45
End: 2020-11-12

## 2020-11-12 NOTE — TELEPHONE ENCOUNTER
Pt. States he was told  He should get a pneumonia shot. He has a follow up appointment in Feb 2021 is it ok to wait until then or should he come in sooner?

## 2020-11-13 NOTE — TELEPHONE ENCOUNTER
Spoke with patient notified per Dr. Chavez Hands can hold off until 2021 appt for pneumonia vaccine. Patient verbalized understanding and agreeable to POC.

## 2020-11-23 ENCOUNTER — APPOINTMENT (OUTPATIENT)
Dept: CT IMAGING | Age: 45
End: 2020-11-23
Attending: EMERGENCY MEDICINE
Payer: COMMERCIAL

## 2020-11-23 ENCOUNTER — HOSPITAL ENCOUNTER (OUTPATIENT)
Age: 45
Discharge: HOME | End: 2020-11-23
Attending: EMERGENCY MEDICINE
Payer: COMMERCIAL

## 2020-11-23 VITALS
BODY MASS INDEX: 25.77 KG/M2 | HEART RATE: 72 BPM | WEIGHT: 180 LBS | HEIGHT: 70 IN | DIASTOLIC BLOOD PRESSURE: 89 MMHG | TEMPERATURE: 97 F | SYSTOLIC BLOOD PRESSURE: 141 MMHG | RESPIRATION RATE: 18 BRPM | OXYGEN SATURATION: 99 %

## 2020-11-23 DIAGNOSIS — R10.9 ABDOMINAL WALL PAIN: Primary | ICD-10-CM

## 2020-11-23 PROCEDURE — 80047 BASIC METABLC PNL IONIZED CA: CPT

## 2020-11-23 PROCEDURE — 36415 COLL VENOUS BLD VENIPUNCTURE: CPT

## 2020-11-23 PROCEDURE — 81002 URINALYSIS NONAUTO W/O SCOPE: CPT | Performed by: EMERGENCY MEDICINE

## 2020-11-23 PROCEDURE — 99214 OFFICE O/P EST MOD 30 MIN: CPT

## 2020-11-23 PROCEDURE — 74177 CT ABD & PELVIS W/CONTRAST: CPT | Performed by: EMERGENCY MEDICINE

## 2020-11-23 PROCEDURE — 85025 COMPLETE CBC W/AUTO DIFF WBC: CPT | Performed by: EMERGENCY MEDICINE

## 2020-11-23 NOTE — ED PROVIDER NOTES
Patient Seen in: [facility]      History   Patient presents with:  Abdomen/Flank Pain    Stated Complaint: abd pain for 3 weeks     HPI    Patient presents with abdominal pain.   The patient states that starting a few weeks ago he noticed 141/89   Pulse 81   Resp 20   Temp 97.2 °F (36.2 °C)   Temp src Temporal   SpO2 99 %   O2 Device None (Room air)       Current:/89   Pulse 81   Temp 97.2 °F (36.2 °C) (Temporal)   Resp 20   Ht 177.8 cm (5' 10\")   Wt 81.6 kg   SpO2 99%   BMI 25.83 kg [id number]  [phone]                Medications Prescribed:  Current Discharge Medication List

## 2020-11-23 NOTE — IMMEDIATE CARE/WORKERS COMP PHYSICIAN REPORT
Patient was signed out to follow-up the CT results. Ct Abdomen+pelvis(contrast Only)(cpt=74177)    Result Date: 11/23/2020  PROCEDURE:  CT ABDOMEN+PELVIS (CONTRAST ONLY) (CPT=74177)  COMPARISON:  None.   INDICATIONS:  abd pain for 3 weeks  TECHNIQUE:  CT Kita Rivera MD on 11/23/2020 at 3:00 PM     Finalized by (CST): Kita Rivera MD on 11/23/2020 at 3:19 PM     The patient's urinalysis did show blood. .  Discussed that the CT shows no acute pathology signed out if the CT shows no acute pathology patient

## 2020-11-24 ENCOUNTER — OFFICE VISIT (OUTPATIENT)
Dept: INTERNAL MEDICINE CLINIC | Facility: CLINIC | Age: 45
End: 2020-11-24
Payer: COMMERCIAL

## 2020-11-24 VITALS
WEIGHT: 179 LBS | RESPIRATION RATE: 16 BRPM | DIASTOLIC BLOOD PRESSURE: 64 MMHG | BODY MASS INDEX: 26 KG/M2 | HEART RATE: 88 BPM | SYSTOLIC BLOOD PRESSURE: 122 MMHG | TEMPERATURE: 99 F

## 2020-11-24 DIAGNOSIS — S39.011A STRAIN OF ABDOMINAL WALL, INITIAL ENCOUNTER: Primary | ICD-10-CM

## 2020-11-24 DIAGNOSIS — E11.65 UNCONTROLLED TYPE 2 DIABETES MELLITUS WITH HYPERGLYCEMIA (HCC): ICD-10-CM

## 2020-11-24 PROCEDURE — 99072 ADDL SUPL MATRL&STAF TM PHE: CPT | Performed by: PHYSICIAN ASSISTANT

## 2020-11-24 PROCEDURE — 3078F DIAST BP <80 MM HG: CPT | Performed by: PHYSICIAN ASSISTANT

## 2020-11-24 PROCEDURE — 99214 OFFICE O/P EST MOD 30 MIN: CPT | Performed by: PHYSICIAN ASSISTANT

## 2020-11-24 PROCEDURE — 90471 IMMUNIZATION ADMIN: CPT | Performed by: PHYSICIAN ASSISTANT

## 2020-11-24 PROCEDURE — 3074F SYST BP LT 130 MM HG: CPT | Performed by: PHYSICIAN ASSISTANT

## 2020-11-24 PROCEDURE — 90732 PPSV23 VACC 2 YRS+ SUBQ/IM: CPT | Performed by: PHYSICIAN ASSISTANT

## 2020-11-24 NOTE — PROGRESS NOTES
Enma Chopra is a 50year old male. HPI:   Patient presents for f/u from [employer] visit yesterday. C/o pain just to the R of his umbilicus x 3 weeks. C/o feeling of \"inflammation\". C/o tenderness to touch and pain with heavy lifting.   Notes he works thyromegaly, no lymphadenopathy  LUNGS: regular breathing rate and effort, clear to auscultation bilaterally  CARDIO: RRR, normal S1 & S2, no murmur  GI: soft, non-distended, there is a tender rope-like mass palpated subcutaneously in the RUQ a few cm from

## 2020-11-24 NOTE — PATIENT INSTRUCTIONS
Abdominal wall strain:  - avoid heavy lifting/pushing/pulling  - ice/heat (10-15 minutes at a time)  - tylenol (1,000 mg every 8 hours as needed) or ibuprofen 400-600 mg every 8 hours as needed with food    Follow up visit with Dr. Enriqueta Pedro in February for safia

## 2021-01-22 DIAGNOSIS — E11.9 TYPE 2 DIABETES MELLITUS WITHOUT COMPLICATION, WITHOUT LONG-TERM CURRENT USE OF INSULIN (HCC): ICD-10-CM

## 2021-01-22 DIAGNOSIS — Z00.00 ROUTINE GENERAL MEDICAL EXAMINATION AT A HEALTH CARE FACILITY: ICD-10-CM

## 2021-01-25 RX ORDER — ATORVASTATIN CALCIUM 20 MG/1
20 TABLET, FILM COATED ORAL NIGHTLY
Qty: 90 TABLET | Refills: 0 | Status: SHIPPED | OUTPATIENT
Start: 2021-01-25 | End: 2021-03-02

## 2021-01-25 NOTE — TELEPHONE ENCOUNTER
Medication(s) to Refill:   Requested Prescriptions     Pending Prescriptions Disp Refills   • ATORVASTATIN 20 MG Oral Tab [Pharmacy Med Name: ATORVASTATIN  20MG  TAB] 90 tablet 3     Sig: TOME 1 TABLETA POR LA THALIA HATFIELD       LOV: 11-    RTC:

## 2021-02-15 ENCOUNTER — LAB ENCOUNTER (OUTPATIENT)
Dept: LAB | Age: 46
End: 2021-02-15
Attending: INTERNAL MEDICINE
Payer: COMMERCIAL

## 2021-02-15 DIAGNOSIS — E11.9 TYPE 2 DIABETES MELLITUS WITHOUT COMPLICATION, WITHOUT LONG-TERM CURRENT USE OF INSULIN (HCC): ICD-10-CM

## 2021-02-15 DIAGNOSIS — E78.5 HYPERLIPIDEMIA ASSOCIATED WITH TYPE 2 DIABETES MELLITUS (HCC): ICD-10-CM

## 2021-02-15 DIAGNOSIS — E11.69 HYPERLIPIDEMIA ASSOCIATED WITH TYPE 2 DIABETES MELLITUS (HCC): ICD-10-CM

## 2021-02-15 DIAGNOSIS — K51.20 ULCERATIVE PROCTITIS WITHOUT COMPLICATION (HCC): ICD-10-CM

## 2021-02-15 LAB
ALBUMIN SERPL-MCNC: 4.3 G/DL (ref 3.4–5)
ALBUMIN/GLOB SERPL: 1.2 {RATIO} (ref 1–2)
ALP LIVER SERPL-CCNC: 129 U/L
ALT SERPL-CCNC: 20 U/L
ANION GAP SERPL CALC-SCNC: 4 MMOL/L (ref 0–18)
AST SERPL-CCNC: 6 U/L (ref 15–37)
BASOPHILS # BLD AUTO: 0.03 X10(3) UL (ref 0–0.2)
BASOPHILS NFR BLD AUTO: 0.5 %
BILIRUB SERPL-MCNC: 0.5 MG/DL (ref 0.1–2)
BUN BLD-MCNC: 15 MG/DL (ref 7–18)
BUN/CREAT SERPL: 13.4 (ref 10–20)
CALCIUM BLD-MCNC: 9.3 MG/DL (ref 8.5–10.1)
CHLORIDE SERPL-SCNC: 105 MMOL/L (ref 98–112)
CHOLEST SMN-MCNC: 106 MG/DL (ref ?–200)
CO2 SERPL-SCNC: 28 MMOL/L (ref 21–32)
CREAT BLD-MCNC: 1.12 MG/DL
CRP SERPL-MCNC: <0.29 MG/DL (ref ?–0.3)
DEPRECATED RDW RBC AUTO: 42.2 FL (ref 35.1–46.3)
EOSINOPHIL # BLD AUTO: 0.11 X10(3) UL (ref 0–0.7)
EOSINOPHIL NFR BLD AUTO: 1.8 %
ERYTHROCYTE [DISTWIDTH] IN BLOOD BY AUTOMATED COUNT: 12.8 % (ref 11–15)
EST. AVERAGE GLUCOSE BLD GHB EST-MCNC: 180 MG/DL (ref 68–126)
GLOBULIN PLAS-MCNC: 3.5 G/DL (ref 2.8–4.4)
GLUCOSE BLD-MCNC: 147 MG/DL (ref 70–99)
HBA1C MFR BLD HPLC: 7.9 % (ref ?–5.7)
HCT VFR BLD AUTO: 49.4 %
HCV AB SERPL QL IA: NONREACTIVE
HDLC SERPL-MCNC: 41 MG/DL (ref 40–59)
HGB BLD-MCNC: 16.5 G/DL
IMM GRANULOCYTES # BLD AUTO: 0.02 X10(3) UL (ref 0–1)
IMM GRANULOCYTES NFR BLD: 0.3 %
LDLC SERPL CALC-MCNC: 39 MG/DL (ref ?–100)
LYMPHOCYTES # BLD AUTO: 2.29 X10(3) UL (ref 1–4)
LYMPHOCYTES NFR BLD AUTO: 37.7 %
M PROTEIN MFR SERPL ELPH: 7.8 G/DL (ref 6.4–8.2)
MCH RBC QN AUTO: 30 PG (ref 26–34)
MCHC RBC AUTO-ENTMCNC: 33.4 G/DL (ref 31–37)
MCV RBC AUTO: 89.8 FL
MONOCYTES # BLD AUTO: 0.39 X10(3) UL (ref 0.1–1)
MONOCYTES NFR BLD AUTO: 6.4 %
NEUTROPHILS # BLD AUTO: 3.24 X10 (3) UL (ref 1.5–7.7)
NEUTROPHILS # BLD AUTO: 3.24 X10(3) UL (ref 1.5–7.7)
NEUTROPHILS NFR BLD AUTO: 53.3 %
NONHDLC SERPL-MCNC: 65 MG/DL (ref ?–130)
OSMOLALITY SERPL CALC.SUM OF ELEC: 288 MOSM/KG (ref 275–295)
PATIENT FASTING Y/N/NP: YES
PATIENT FASTING Y/N/NP: YES
PLATELET # BLD AUTO: 264 10(3)UL (ref 150–450)
POTASSIUM SERPL-SCNC: 4.2 MMOL/L (ref 3.5–5.1)
RBC # BLD AUTO: 5.5 X10(6)UL
SED RATE-ML: 9 MM/HR
SODIUM SERPL-SCNC: 137 MMOL/L (ref 136–145)
TRIGL SERPL-MCNC: 129 MG/DL (ref 30–149)
VLDLC SERPL CALC-MCNC: 26 MG/DL (ref 0–30)
WBC # BLD AUTO: 6.1 X10(3) UL (ref 4–11)

## 2021-02-15 PROCEDURE — 83993 ASSAY FOR CALPROTECTIN FECAL: CPT

## 2021-02-15 PROCEDURE — 86140 C-REACTIVE PROTEIN: CPT

## 2021-02-15 PROCEDURE — 83036 HEMOGLOBIN GLYCOSYLATED A1C: CPT

## 2021-02-15 PROCEDURE — 80061 LIPID PANEL: CPT

## 2021-02-15 PROCEDURE — 85652 RBC SED RATE AUTOMATED: CPT

## 2021-02-15 PROCEDURE — 85025 COMPLETE CBC W/AUTO DIFF WBC: CPT

## 2021-02-15 PROCEDURE — 86803 HEPATITIS C AB TEST: CPT

## 2021-02-15 PROCEDURE — 3051F HG A1C>EQUAL 7.0%<8.0%: CPT | Performed by: INTERNAL MEDICINE

## 2021-02-15 PROCEDURE — 36415 COLL VENOUS BLD VENIPUNCTURE: CPT

## 2021-02-15 PROCEDURE — 80053 COMPREHEN METABOLIC PANEL: CPT

## 2021-02-18 LAB — CALPROTECTIN STL-MCNT: <5 ΜG/G (ref ?–50)

## 2021-02-19 ENCOUNTER — OFFICE VISIT (OUTPATIENT)
Dept: INTERNAL MEDICINE CLINIC | Facility: CLINIC | Age: 46
End: 2021-02-19
Payer: COMMERCIAL

## 2021-02-19 VITALS
SYSTOLIC BLOOD PRESSURE: 110 MMHG | HEART RATE: 66 BPM | BODY MASS INDEX: 25.94 KG/M2 | RESPIRATION RATE: 16 BRPM | DIASTOLIC BLOOD PRESSURE: 68 MMHG | TEMPERATURE: 98 F | HEIGHT: 70 IN | OXYGEN SATURATION: 99 % | WEIGHT: 181.19 LBS

## 2021-02-19 DIAGNOSIS — E11.69 HYPERLIPIDEMIA ASSOCIATED WITH TYPE 2 DIABETES MELLITUS (HCC): ICD-10-CM

## 2021-02-19 DIAGNOSIS — E11.9 TYPE 2 DIABETES MELLITUS WITHOUT COMPLICATION, WITHOUT LONG-TERM CURRENT USE OF INSULIN (HCC): Primary | ICD-10-CM

## 2021-02-19 DIAGNOSIS — E78.5 HYPERLIPIDEMIA ASSOCIATED WITH TYPE 2 DIABETES MELLITUS (HCC): ICD-10-CM

## 2021-02-19 DIAGNOSIS — F33.0 MILD EPISODE OF RECURRENT MAJOR DEPRESSIVE DISORDER (HCC): ICD-10-CM

## 2021-02-19 PROCEDURE — 3008F BODY MASS INDEX DOCD: CPT | Performed by: INTERNAL MEDICINE

## 2021-02-19 PROCEDURE — 3074F SYST BP LT 130 MM HG: CPT | Performed by: INTERNAL MEDICINE

## 2021-02-19 PROCEDURE — 3078F DIAST BP <80 MM HG: CPT | Performed by: INTERNAL MEDICINE

## 2021-02-19 PROCEDURE — 99214 OFFICE O/P EST MOD 30 MIN: CPT | Performed by: INTERNAL MEDICINE

## 2021-02-19 RX ORDER — BUPROPION HYDROCHLORIDE 300 MG/1
300 TABLET ORAL DAILY
Qty: 30 TABLET | Refills: 1 | Status: SHIPPED | OUTPATIENT
Start: 2021-02-19 | End: 2021-08-02

## 2021-02-19 RX ORDER — BUPROPION HYDROCHLORIDE 150 MG/1
150 TABLET ORAL SEE ADMIN INSTRUCTIONS
Qty: 7 TABLET | Refills: 0 | Status: SHIPPED | OUTPATIENT
Start: 2021-02-19 | End: 2021-08-02

## 2021-02-19 NOTE — PATIENT INSTRUCTIONS
Continúe con la metformina 1000 mg con el desayuno y la lashay. Repita javier A1C en 3 meses y nos vemos maría semana después.     Para javier ansiedad y depresión, comience con wellbutrin (buproprion) de la siguiente manera:  Semana 1: wellbutrin 150 mg al día  Elizabeth Gaona

## 2021-02-19 NOTE — PROGRESS NOTES
Cece Drew is a 50year old male. HPI:   Patient presents for the following issues. 1. DM: he has been eating poorly and never increased his metformin as I had instructed.  However, 5 days ago started to make nutritional changes and he hs star Types: Cigarettes        Start date: 2002        Quit date: 02/2018        Years since quitting: 3.0      Smokeless tobacco: Never Used    Alcohol use: Yes      Frequency: Monthly or less      Drinks per session: 1 or 2      Binge frequency: Never     Screen: done Feb, 2021   - not on ASA  # Health Maintenance: CPX on 8/10/2020  Stress Management: discussed coping strategies and resources if not improving. Indication for ASA (50-57 yo): no indication at this time.    Colon Cancer Screening: Colonoscopy

## 2021-03-01 ENCOUNTER — HOSPITAL ENCOUNTER (OUTPATIENT)
Dept: MRI IMAGING | Age: 46
Discharge: HOME | End: 2021-03-01
Attending: NURSE PRACTITIONER
Payer: COMMERCIAL

## 2021-03-01 DIAGNOSIS — R74.8 ELEVATED ALKALINE PHOSPHATASE LEVEL: ICD-10-CM

## 2021-03-01 DIAGNOSIS — Z00.00 ROUTINE GENERAL MEDICAL EXAMINATION AT A HEALTH CARE FACILITY: ICD-10-CM

## 2021-03-01 DIAGNOSIS — E11.9 TYPE 2 DIABETES MELLITUS WITHOUT COMPLICATION, WITHOUT LONG-TERM CURRENT USE OF INSULIN (HCC): ICD-10-CM

## 2021-03-01 DIAGNOSIS — K51.20 ULCERATIVE PROCTITIS WITHOUT COMPLICATION (HCC): ICD-10-CM

## 2021-03-01 PROCEDURE — 76376 3D RENDER W/INTRP POSTPROCES: CPT | Performed by: NURSE PRACTITIONER

## 2021-03-01 PROCEDURE — 74181 MRI ABDOMEN W/O CONTRAST: CPT | Performed by: NURSE PRACTITIONER

## 2021-03-01 NOTE — TELEPHONE ENCOUNTER
Patient came in requesting a refill of Atorvastatin to be sent to:    [address] [place] [address], [address] [place] [address] [phone], [phone]

## 2021-03-02 RX ORDER — ATORVASTATIN CALCIUM 20 MG/1
20 TABLET, FILM COATED ORAL NIGHTLY
Qty: 90 TABLET | Refills: 3 | Status: SHIPPED | OUTPATIENT
Start: 2021-03-02 | End: 2021-10-12 | Stop reason: ALTCHOICE

## 2021-03-03 ENCOUNTER — LAB ENCOUNTER (OUTPATIENT)
Dept: LAB | Age: 46
End: 2021-03-03
Attending: INTERNAL MEDICINE
Payer: COMMERCIAL

## 2021-03-03 DIAGNOSIS — R74.8 ELEVATED ALKALINE PHOSPHATASE LEVEL: ICD-10-CM

## 2021-03-03 DIAGNOSIS — E11.9 TYPE 2 DIABETES MELLITUS WITHOUT COMPLICATION, WITHOUT LONG-TERM CURRENT USE OF INSULIN (HCC): ICD-10-CM

## 2021-03-03 PROCEDURE — 84075 ASSAY ALKALINE PHOSPHATASE: CPT

## 2021-03-03 PROCEDURE — 36415 COLL VENOUS BLD VENIPUNCTURE: CPT

## 2021-03-03 PROCEDURE — 86038 ANTINUCLEAR ANTIBODIES: CPT

## 2021-03-03 PROCEDURE — 84080 ASSAY ALKALINE PHOSPHATASES: CPT

## 2021-03-03 PROCEDURE — 83516 IMMUNOASSAY NONANTIBODY: CPT

## 2021-03-04 ENCOUNTER — TELEPHONE (OUTPATIENT)
Dept: INTERNAL MEDICINE CLINIC | Facility: CLINIC | Age: 46
End: 2021-03-04

## 2021-03-04 NOTE — TELEPHONE ENCOUNTER
Patient called stating that pharmacy is not filling: METFORMIN HCl, possible due to dosage change. Patient stated that pharmacy has been trying to fax paperwork and no response has been received.      [facility], [address] [address] [place] [address] [address]

## 2021-03-04 NOTE — TELEPHONE ENCOUNTER
1st attempt- Called pharmacy to clarify dosage, was on hold for over 20 mins and called was disconnected. Received refill request form via fax. Placed in [address] in basket to be signed.

## 2021-03-04 NOTE — TELEPHONE ENCOUNTER
Patient should receive metformin 1000 mg to be taken 1 tablet twice daily. Dispense 180 tabs with 1 reffill.

## 2021-03-05 LAB
ALK-PHOSPHATASE BONE CALC: 60 U/L
ALK-PHOSPHATASE LIVER CALC: 54 U/L
ALK-PHOSPHATASE OTHER CALC: 0 U/L
ALKALINE PHOSPHATASE: 114 U/L
ANA SCREEN: NEGATIVE
MITOCHONDRIAL M2 AB, IGG: 63.4 UNITS

## 2021-04-01 ENCOUNTER — IMMUNIZATION (OUTPATIENT)
Dept: LAB | Age: 46
End: 2021-04-01
Attending: HOSPITALIST
Payer: COMMERCIAL

## 2021-04-01 DIAGNOSIS — Z23 NEED FOR VACCINATION: Primary | ICD-10-CM

## 2021-04-01 PROCEDURE — 0001A SARSCOV2 VAC 30MCG/0.3ML IM: CPT

## 2021-04-22 ENCOUNTER — IMMUNIZATION (OUTPATIENT)
Dept: LAB | Age: 46
End: 2021-04-22
Attending: HOSPITALIST
Payer: COMMERCIAL

## 2021-04-22 DIAGNOSIS — Z23 NEED FOR VACCINATION: Primary | ICD-10-CM

## 2021-04-22 PROCEDURE — 0002A SARSCOV2 VAC 30MCG/0.3ML IM: CPT

## 2021-05-28 ENCOUNTER — TELEPHONE (OUTPATIENT)
Dept: INTERNAL MEDICINE CLINIC | Facility: CLINIC | Age: 46
End: 2021-05-28

## 2021-05-28 NOTE — TELEPHONE ENCOUNTER
Pt is requesting to ask dr. Juliette Pitt if there is any way she can fit him in for a sooner appointment with her, pt does not want to see any other provider, pt also wants to know if there is any additional lab test dr. Juliette Pitt wants pt to do?

## 2021-05-28 NOTE — TELEPHONE ENCOUNTER
Spoke with patient denies being symptomatic, he was under the impression Dr. Otto Eats wanted him to f/u in 3 months but did not schedule appointment sooner, he is scheduled for 8/2/2021. Per Dr. Sullivan Angelita 02/19/2021 patient to f/u in 6 months.    ASSESSMENT AN

## 2021-06-12 ENCOUNTER — LAB ENCOUNTER (OUTPATIENT)
Dept: LAB | Age: 46
End: 2021-06-12
Attending: INTERNAL MEDICINE
Payer: COMMERCIAL

## 2021-06-12 DIAGNOSIS — R74.8 ELEVATED ALKALINE PHOSPHATASE LEVEL: ICD-10-CM

## 2021-06-12 DIAGNOSIS — E78.5 HYPERLIPIDEMIA ASSOCIATED WITH TYPE 2 DIABETES MELLITUS (HCC): ICD-10-CM

## 2021-06-12 DIAGNOSIS — E11.9 TYPE 2 DIABETES MELLITUS WITHOUT COMPLICATION, WITHOUT LONG-TERM CURRENT USE OF INSULIN (HCC): ICD-10-CM

## 2021-06-12 DIAGNOSIS — E11.69 HYPERLIPIDEMIA ASSOCIATED WITH TYPE 2 DIABETES MELLITUS (HCC): ICD-10-CM

## 2021-06-12 DIAGNOSIS — Z00.00 ROUTINE GENERAL MEDICAL EXAMINATION AT A HEALTH CARE FACILITY: ICD-10-CM

## 2021-06-12 DIAGNOSIS — L60.9 FINGERNAIL ABNORMALITIES: ICD-10-CM

## 2021-06-12 PROCEDURE — 3061F NEG MICROALBUMINURIA REV: CPT | Performed by: INTERNAL MEDICINE

## 2021-06-12 PROCEDURE — 83036 HEMOGLOBIN GLYCOSYLATED A1C: CPT | Performed by: INTERNAL MEDICINE

## 2021-06-12 PROCEDURE — 3044F HG A1C LEVEL LT 7.0%: CPT | Performed by: INTERNAL MEDICINE

## 2021-06-12 PROCEDURE — 82570 ASSAY OF URINE CREATININE: CPT | Performed by: INTERNAL MEDICINE

## 2021-06-12 PROCEDURE — 82043 UR ALBUMIN QUANTITATIVE: CPT | Performed by: INTERNAL MEDICINE

## 2021-07-21 NOTE — TELEPHONE ENCOUNTER
Medication(s) to Refill:   Requested Prescriptions     Pending Prescriptions Disp Refills   • metFORMIN HCl 1000 MG Oral Tab 180 tablet 1     Sig: Take 1 tablet (1,000 mg total) by mouth 2 (two) times daily with meals.        LOV: 2-    RTC:  6 month

## 2021-08-02 ENCOUNTER — OFFICE VISIT (OUTPATIENT)
Dept: INTERNAL MEDICINE CLINIC | Facility: CLINIC | Age: 46
End: 2021-08-02
Payer: COMMERCIAL

## 2021-08-02 ENCOUNTER — PATIENT MESSAGE (OUTPATIENT)
Dept: INTERNAL MEDICINE CLINIC | Facility: CLINIC | Age: 46
End: 2021-08-02

## 2021-08-02 VITALS
SYSTOLIC BLOOD PRESSURE: 110 MMHG | BODY MASS INDEX: 25.11 KG/M2 | HEART RATE: 80 BPM | DIASTOLIC BLOOD PRESSURE: 70 MMHG | HEIGHT: 70 IN | OXYGEN SATURATION: 99 % | WEIGHT: 175.38 LBS | RESPIRATION RATE: 18 BRPM | TEMPERATURE: 98 F

## 2021-08-02 DIAGNOSIS — K51.20 ULCERATIVE PROCTITIS WITHOUT COMPLICATION (HCC): ICD-10-CM

## 2021-08-02 DIAGNOSIS — F17.201 TOBACCO DEPENDENCE IN REMISSION: ICD-10-CM

## 2021-08-02 DIAGNOSIS — E78.5 HYPERLIPIDEMIA ASSOCIATED WITH TYPE 2 DIABETES MELLITUS (HCC): ICD-10-CM

## 2021-08-02 DIAGNOSIS — F17.200 TOBACCO DEPENDENCE: ICD-10-CM

## 2021-08-02 DIAGNOSIS — E11.9 TYPE 2 DIABETES MELLITUS WITHOUT COMPLICATION, WITHOUT LONG-TERM CURRENT USE OF INSULIN (HCC): Primary | ICD-10-CM

## 2021-08-02 DIAGNOSIS — E11.69 HYPERLIPIDEMIA ASSOCIATED WITH TYPE 2 DIABETES MELLITUS (HCC): ICD-10-CM

## 2021-08-02 PROBLEM — Z86.0100 PERSONAL HISTORY OF COLONIC POLYPS: Status: RESOLVED | Noted: 2018-09-13 | Resolved: 2021-08-02

## 2021-08-02 PROBLEM — S05.02XA CORNEAL ABRASION, LEFT, INITIAL ENCOUNTER: Status: RESOLVED | Noted: 2019-06-16 | Resolved: 2021-08-02

## 2021-08-02 PROBLEM — Z86.010 PERSONAL HISTORY OF COLONIC POLYPS: Status: RESOLVED | Noted: 2018-09-13 | Resolved: 2021-08-02

## 2021-08-02 PROBLEM — H20.9 TRAUMATIC IRITIS: Status: RESOLVED | Noted: 2019-06-16 | Resolved: 2021-08-02

## 2021-08-02 PROCEDURE — 99214 OFFICE O/P EST MOD 30 MIN: CPT | Performed by: INTERNAL MEDICINE

## 2021-08-02 PROCEDURE — 3074F SYST BP LT 130 MM HG: CPT | Performed by: INTERNAL MEDICINE

## 2021-08-02 PROCEDURE — 3078F DIAST BP <80 MM HG: CPT | Performed by: INTERNAL MEDICINE

## 2021-08-02 PROCEDURE — 3008F BODY MASS INDEX DOCD: CPT | Performed by: INTERNAL MEDICINE

## 2021-08-02 RX ORDER — BUPROPION HYDROCHLORIDE 300 MG/1
300 TABLET ORAL DAILY
Qty: 90 TABLET | Refills: 1 | Status: SHIPPED | OUTPATIENT
Start: 2021-08-02 | End: 2021-12-22

## 2021-08-02 RX ORDER — BUPROPION HYDROCHLORIDE 150 MG/1
150 TABLET ORAL SEE ADMIN INSTRUCTIONS
Qty: 7 TABLET | Refills: 0 | Status: SHIPPED | OUTPATIENT
Start: 2021-08-02 | End: 2021-12-22 | Stop reason: ALTCHOICE

## 2021-08-02 NOTE — PATIENT INSTRUCTIONS
Ahora le toca el examen de la vista para diabéticos. Para ayudarlo a dejar de fumar, reinicie el medicamento llamado bupropión (wellbutrin). [place] 150 mg al día lokesh 7 días (se envía a [facility]) y [place] aumente a 300 mg al día (se envía a [facility]).

## 2021-08-02 NOTE — PROGRESS NOTES
Lorena Walden is a 48year old male. HPI:   Patient presents for the following issues. He is 8 days early for CPX.    1. Stress and Anxiety: he took wellbutrin for 2 months but then stopped it because he felt like he was taking too many medication since quittin.5      Smokeless tobacco: Never Used    Vaping Use      Vaping Use: Never used    Alcohol use: Yes    Drug use:  No              EXAM:   /70 (BP Location: Right arm)   Pulse 80   Temp 98 °F (36.7 °C) (Oral)   Resp 18   Ht 5' 10\" (1. exrercises  Abdominal u/s: (any M >65 smoker/smoking): screen at age 54  Lung Cancer Screening: (54 to 76 years, a history of smoking at least 30 pack-years and, if a former smoker, had quit within the previous 15 years): quit smoking in 2/2018.  Does not m

## 2021-10-12 ENCOUNTER — HOSPITAL ENCOUNTER (OUTPATIENT)
Dept: GENERAL RADIOLOGY | Age: 46
Discharge: HOME | End: 2021-10-12
Attending: INTERNAL MEDICINE
Payer: COMMERCIAL

## 2021-10-12 ENCOUNTER — OFFICE VISIT (OUTPATIENT)
Dept: INTERNAL MEDICINE CLINIC | Facility: CLINIC | Age: 46
End: 2021-10-12
Payer: COMMERCIAL

## 2021-10-12 VITALS
HEART RATE: 88 BPM | WEIGHT: 178 LBS | DIASTOLIC BLOOD PRESSURE: 78 MMHG | SYSTOLIC BLOOD PRESSURE: 126 MMHG | RESPIRATION RATE: 16 BRPM | OXYGEN SATURATION: 98 % | HEIGHT: 70 IN | BODY MASS INDEX: 25.48 KG/M2 | TEMPERATURE: 98 F

## 2021-10-12 DIAGNOSIS — Z23 IMMUNIZATION DUE: ICD-10-CM

## 2021-10-12 DIAGNOSIS — E11.9 TYPE 2 DIABETES MELLITUS WITHOUT COMPLICATION, WITHOUT LONG-TERM CURRENT USE OF INSULIN (HCC): ICD-10-CM

## 2021-10-12 DIAGNOSIS — M25.562 LEFT KNEE PAIN, UNSPECIFIED CHRONICITY: ICD-10-CM

## 2021-10-12 DIAGNOSIS — M25.562 LEFT KNEE PAIN, UNSPECIFIED CHRONICITY: Primary | ICD-10-CM

## 2021-10-12 PROCEDURE — 3078F DIAST BP <80 MM HG: CPT | Performed by: INTERNAL MEDICINE

## 2021-10-12 PROCEDURE — 90686 IIV4 VACC NO PRSV 0.5 ML IM: CPT | Performed by: INTERNAL MEDICINE

## 2021-10-12 PROCEDURE — 3044F HG A1C LEVEL LT 7.0%: CPT | Performed by: INTERNAL MEDICINE

## 2021-10-12 PROCEDURE — 3008F BODY MASS INDEX DOCD: CPT | Performed by: INTERNAL MEDICINE

## 2021-10-12 PROCEDURE — 83036 HEMOGLOBIN GLYCOSYLATED A1C: CPT | Performed by: INTERNAL MEDICINE

## 2021-10-12 PROCEDURE — 3074F SYST BP LT 130 MM HG: CPT | Performed by: INTERNAL MEDICINE

## 2021-10-12 PROCEDURE — 73564 X-RAY EXAM KNEE 4 OR MORE: CPT | Performed by: INTERNAL MEDICINE

## 2021-10-12 PROCEDURE — 90471 IMMUNIZATION ADMIN: CPT | Performed by: INTERNAL MEDICINE

## 2021-10-12 PROCEDURE — 99213 OFFICE O/P EST LOW 20 MIN: CPT | Performed by: INTERNAL MEDICINE

## 2021-10-12 NOTE — PROGRESS NOTES
Subjective:   Dmitriy Ba is a 48year old male who presents for Knee Pain     Knee pain- left knee that started about 2 weeks ago. He feels like he \"twisted it. \" does not recall injuring himself. He reports waking up with the pain one morning.    [place] El this encounter: 178 lb (80.7 kg).   PHYSICAL EXAM:   Respiratory: no increased work of breathing;   Cardiovascular:no edema   Neurological: awake, alert, oriented x3; CNII-XII grossly intact; sensory intact in all extremities   MSK: full ROM; strength 5/5;

## 2021-12-22 ENCOUNTER — OFFICE VISIT (OUTPATIENT)
Dept: INTERNAL MEDICINE CLINIC | Facility: CLINIC | Age: 46
End: 2021-12-22
Payer: COMMERCIAL

## 2021-12-22 VITALS
TEMPERATURE: 98 F | HEART RATE: 80 BPM | WEIGHT: 173 LBS | RESPIRATION RATE: 16 BRPM | HEIGHT: 68.5 IN | SYSTOLIC BLOOD PRESSURE: 112 MMHG | DIASTOLIC BLOOD PRESSURE: 76 MMHG | BODY MASS INDEX: 25.92 KG/M2 | OXYGEN SATURATION: 100 %

## 2021-12-22 DIAGNOSIS — K51.20 ULCERATIVE PROCTITIS WITHOUT COMPLICATION (HCC): ICD-10-CM

## 2021-12-22 DIAGNOSIS — F43.9 STRESS: ICD-10-CM

## 2021-12-22 DIAGNOSIS — Z00.00 ANNUAL PHYSICAL EXAM: Primary | ICD-10-CM

## 2021-12-22 DIAGNOSIS — F17.200 TOBACCO DEPENDENCE: ICD-10-CM

## 2021-12-22 DIAGNOSIS — E78.5 HYPERLIPIDEMIA, UNSPECIFIED HYPERLIPIDEMIA TYPE: ICD-10-CM

## 2021-12-22 DIAGNOSIS — B35.1 ONYCHOMYCOSIS: ICD-10-CM

## 2021-12-22 DIAGNOSIS — E11.9 TYPE 2 DIABETES MELLITUS WITHOUT COMPLICATION, WITHOUT LONG-TERM CURRENT USE OF INSULIN (HCC): ICD-10-CM

## 2021-12-22 PROCEDURE — 3078F DIAST BP <80 MM HG: CPT | Performed by: INTERNAL MEDICINE

## 2021-12-22 PROCEDURE — 3074F SYST BP LT 130 MM HG: CPT | Performed by: INTERNAL MEDICINE

## 2021-12-22 PROCEDURE — 3008F BODY MASS INDEX DOCD: CPT | Performed by: INTERNAL MEDICINE

## 2021-12-22 PROCEDURE — 99396 PREV VISIT EST AGE 40-64: CPT | Performed by: INTERNAL MEDICINE

## 2021-12-22 RX ORDER — ATORVASTATIN CALCIUM 20 MG/1
TABLET, FILM COATED ORAL
COMMUNITY
Start: 2021-10-13

## 2021-12-22 RX ORDER — NICOTINE 21 MG/24HR
1 PATCH, TRANSDERMAL 24 HOURS TRANSDERMAL EVERY 24 HOURS
Qty: 42 PATCH | Refills: 0 | Status: SHIPPED | OUTPATIENT
Start: 2021-12-23 | End: 2022-02-03

## 2021-12-22 NOTE — PROGRESS NOTES
Subjective:   Chirag Araujo is a 48year old male with past medical history of ulcerative proctitis diagnosed in 2018, DM2, who presents for CPX     General physical.   Under a lot of stress due to his wife having metastatic breast cancer.      Smoking ma sclera normal; conjunctiva normal   ENT:sinuses non-tender on palpation; tonsils without erythema or exudate  Neck: trachea midline; no adenopathy; thyroid not enlarged   Hematologic/lymphatic:no cervical lymphadenopathy; no supraclavicular adenopathy   Re in report   COVID-19 Vaccine-completed   Diabetes Care Nephropathy Screening due on 06/12/2022  Tobacco Cessation Counseling 2 Years due on 08/02/2023  Colonoscopy due on 08/17/2023  Pneumococcal Vaccine: Birth to 64yrs(2 of 2 - PPSV23) due on 05/31/2038

## 2021-12-22 NOTE — PATIENT INSTRUCTIONS
[facility]   [address] [facility], [address] [facility], [address]   [address] [facility], [address]  [address]  [facility]

## 2022-01-05 NOTE — TELEPHONE ENCOUNTER
Name from pharmacy: metFORMIN HCl 1000 MG Oral Tablet         Will file in chart as: METFORMIN HCL 1000 MG Oral Tab    Sig: TOME 1 TABLETA POR LA BOCA  DOS VECES AL ALIREZA CON LAS  COMIDAS    Disp:  180 tablet    Refills:  3    Start: 1/4/2022    Class: Norm

## 2022-02-18 ENCOUNTER — LAB ENCOUNTER (OUTPATIENT)
Dept: LAB | Age: 47
End: 2022-02-18
Attending: NURSE PRACTITIONER
Payer: COMMERCIAL

## 2022-02-18 ENCOUNTER — LAB ENCOUNTER (OUTPATIENT)
Dept: LAB | Age: 47
End: 2022-02-18
Attending: INTERNAL MEDICINE
Payer: COMMERCIAL

## 2022-02-18 DIAGNOSIS — Z00.00 ANNUAL PHYSICAL EXAM: ICD-10-CM

## 2022-02-18 DIAGNOSIS — E78.5 HYPERLIPIDEMIA, UNSPECIFIED HYPERLIPIDEMIA TYPE: ICD-10-CM

## 2022-02-18 DIAGNOSIS — E11.9 TYPE 2 DIABETES MELLITUS WITHOUT COMPLICATION, WITHOUT LONG-TERM CURRENT USE OF INSULIN (HCC): ICD-10-CM

## 2022-02-18 DIAGNOSIS — E78.5 HYPERLIPIDEMIA ASSOCIATED WITH TYPE 2 DIABETES MELLITUS (HCC): ICD-10-CM

## 2022-02-18 DIAGNOSIS — F17.201 TOBACCO DEPENDENCE IN REMISSION: ICD-10-CM

## 2022-02-18 DIAGNOSIS — E11.69 HYPERLIPIDEMIA ASSOCIATED WITH TYPE 2 DIABETES MELLITUS (HCC): ICD-10-CM

## 2022-02-18 DIAGNOSIS — K51.20 ULCERATIVE PROCTITIS WITHOUT COMPLICATION (HCC): ICD-10-CM

## 2022-02-18 LAB
ALBUMIN SERPL-MCNC: 4.1 G/DL (ref 3.4–5)
ALBUMIN/GLOB SERPL: 1.4 {RATIO} (ref 1–2)
ALP LIVER SERPL-CCNC: 94 U/L
ALT SERPL-CCNC: 22 U/L
ANION GAP SERPL CALC-SCNC: 6 MMOL/L (ref 0–18)
AST SERPL-CCNC: 7 U/L (ref 15–37)
BASOPHILS # BLD AUTO: 0.04 X10(3) UL (ref 0–0.2)
BASOPHILS NFR BLD AUTO: 0.8 %
BILIRUB SERPL-MCNC: 0.6 MG/DL (ref 0.1–2)
CALCIUM BLD-MCNC: 9.2 MG/DL (ref 8.5–10.1)
CHLORIDE SERPL-SCNC: 106 MMOL/L (ref 98–112)
CHOLEST SERPL-MCNC: 125 MG/DL (ref ?–200)
CO2 SERPL-SCNC: 26 MMOL/L (ref 21–32)
CREAT BLD-MCNC: 1.01 MG/DL
CRP SERPL-MCNC: <0.29 MG/DL (ref ?–0.3)
EOSINOPHIL # BLD AUTO: 0.09 X10(3) UL (ref 0–0.7)
EOSINOPHIL NFR BLD AUTO: 1.8 %
ERYTHROCYTE [DISTWIDTH] IN BLOOD BY AUTOMATED COUNT: 12.9 %
EST. AVERAGE GLUCOSE BLD GHB EST-MCNC: 157 MG/DL (ref 68–126)
FASTING PATIENT LIPID ANSWER: YES
FASTING STATUS PATIENT QL REPORTED: YES
GLOBULIN PLAS-MCNC: 3 G/DL (ref 2.8–4.4)
GLUCOSE BLD-MCNC: 136 MG/DL (ref 70–99)
HBA1C MFR BLD: 7.1 % (ref ?–5.7)
HCT VFR BLD AUTO: 45.5 %
HDLC SERPL-MCNC: 45 MG/DL (ref 40–59)
HGB BLD-MCNC: 15.2 G/DL
IMM GRANULOCYTES # BLD AUTO: 0.02 X10(3) UL (ref 0–1)
IMM GRANULOCYTES NFR BLD: 0.4 %
LDLC SERPL CALC-MCNC: 63 MG/DL (ref ?–100)
LYMPHOCYTES # BLD AUTO: 2.3 X10(3) UL (ref 1–4)
LYMPHOCYTES NFR BLD AUTO: 45.1 %
MCH RBC QN AUTO: 29.6 PG (ref 26–34)
MCHC RBC AUTO-ENTMCNC: 33.4 G/DL (ref 31–37)
MCV RBC AUTO: 88.5 FL
MONOCYTES # BLD AUTO: 0.4 X10(3) UL (ref 0.1–1)
MONOCYTES NFR BLD AUTO: 7.8 %
NEUTROPHILS # BLD AUTO: 2.25 X10 (3) UL (ref 1.5–7.7)
NEUTROPHILS # BLD AUTO: 2.25 X10(3) UL (ref 1.5–7.7)
NEUTROPHILS NFR BLD AUTO: 44.1 %
NONHDLC SERPL-MCNC: 80 MG/DL (ref ?–130)
OSMOLALITY SERPL CALC.SUM OF ELEC: 290 MOSM/KG (ref 275–295)
PLATELET # BLD AUTO: 259 10(3)UL (ref 150–450)
POTASSIUM SERPL-SCNC: 4.4 MMOL/L (ref 3.5–5.1)
PROT SERPL-MCNC: 7.1 G/DL (ref 6.4–8.2)
RBC # BLD AUTO: 5.14 X10(6)UL
SODIUM SERPL-SCNC: 138 MMOL/L (ref 136–145)
TRIGL SERPL-MCNC: 88 MG/DL (ref 30–149)
TSI SER-ACNC: 0.99 MIU/ML (ref 0.36–3.74)
VLDLC SERPL CALC-MCNC: 13 MG/DL (ref 0–30)
WBC # BLD AUTO: 5.1 X10(3) UL (ref 4–11)

## 2022-02-18 PROCEDURE — 83036 HEMOGLOBIN GLYCOSYLATED A1C: CPT | Performed by: INTERNAL MEDICINE

## 2022-02-18 PROCEDURE — 3051F HG A1C>EQUAL 7.0%<8.0%: CPT | Performed by: INTERNAL MEDICINE

## 2022-02-18 PROCEDURE — 80061 LIPID PANEL: CPT | Performed by: INTERNAL MEDICINE

## 2022-02-18 PROCEDURE — 80050 GENERAL HEALTH PANEL: CPT | Performed by: INTERNAL MEDICINE

## 2022-02-22 ENCOUNTER — OFFICE VISIT (OUTPATIENT)
Dept: INTERNAL MEDICINE CLINIC | Facility: CLINIC | Age: 47
End: 2022-02-22
Payer: COMMERCIAL

## 2022-02-22 VITALS
SYSTOLIC BLOOD PRESSURE: 120 MMHG | HEART RATE: 74 BPM | TEMPERATURE: 99 F | HEIGHT: 69 IN | OXYGEN SATURATION: 100 % | BODY MASS INDEX: 26.48 KG/M2 | RESPIRATION RATE: 12 BRPM | WEIGHT: 178.81 LBS | DIASTOLIC BLOOD PRESSURE: 74 MMHG

## 2022-02-22 DIAGNOSIS — Z23 IMMUNIZATION DUE: Primary | ICD-10-CM

## 2022-02-22 DIAGNOSIS — L81.9 HYPERPIGMENTATION: ICD-10-CM

## 2022-02-22 DIAGNOSIS — F17.200 TOBACCO DEPENDENCE: ICD-10-CM

## 2022-02-22 DIAGNOSIS — K51.20 ULCERATIVE PROCTITIS WITHOUT COMPLICATION (HCC): ICD-10-CM

## 2022-02-22 DIAGNOSIS — E11.9 TYPE 2 DIABETES MELLITUS WITHOUT COMPLICATION, WITHOUT LONG-TERM CURRENT USE OF INSULIN (HCC): ICD-10-CM

## 2022-02-22 PROCEDURE — 90471 IMMUNIZATION ADMIN: CPT | Performed by: INTERNAL MEDICINE

## 2022-02-22 PROCEDURE — 3008F BODY MASS INDEX DOCD: CPT | Performed by: INTERNAL MEDICINE

## 2022-02-22 PROCEDURE — 90746 HEPB VACCINE 3 DOSE ADULT IM: CPT | Performed by: INTERNAL MEDICINE

## 2022-02-22 PROCEDURE — 3078F DIAST BP <80 MM HG: CPT | Performed by: INTERNAL MEDICINE

## 2022-02-22 PROCEDURE — 99214 OFFICE O/P EST MOD 30 MIN: CPT | Performed by: INTERNAL MEDICINE

## 2022-02-22 PROCEDURE — 3074F SYST BP LT 130 MM HG: CPT | Performed by: INTERNAL MEDICINE

## 2022-02-22 RX ORDER — NICOTINE 21 MG/24HR
1 PATCH, TRANSDERMAL 24 HOURS TRANSDERMAL EVERY 24 HOURS
Qty: 42 PATCH | Refills: 0 | Status: SHIPPED | OUTPATIENT
Start: 2022-02-22 | End: 2022-04-05

## 2022-03-01 RX ORDER — ATORVASTATIN CALCIUM 20 MG/1
TABLET, FILM COATED ORAL
Qty: 90 TABLET | Refills: 3 | Status: SHIPPED | OUTPATIENT
Start: 2022-03-01

## 2022-03-01 NOTE — ED AVS SNAPSHOT
Jett   MRN: [id number]    Department:  [facility] Emergency Department   Date of Visit:  12/2/2019           Disclosure     Insurance plans vary and the physician(s) referred by the ER may not be covered by your plan.  Please contact you tell this physician (or your personal doctor if your instructions are to return to your personal doctor) about any new or lasting problems. The primary care or specialist physician will see patients referred from the [facility] Emergency Department.  Mg Neely

## 2022-03-14 ENCOUNTER — TELEPHONE (OUTPATIENT)
Dept: INTERNAL MEDICINE CLINIC | Facility: CLINIC | Age: 47
End: 2022-03-14

## 2022-03-14 DIAGNOSIS — Z23 IMMUNIZATION DUE: Primary | ICD-10-CM

## 2022-03-24 ENCOUNTER — NURSE ONLY (OUTPATIENT)
Dept: INTERNAL MEDICINE CLINIC | Facility: CLINIC | Age: 47
End: 2022-03-24
Payer: COMMERCIAL

## 2022-03-24 PROCEDURE — 90746 HEPB VACCINE 3 DOSE ADULT IM: CPT | Performed by: INTERNAL MEDICINE

## 2022-03-24 PROCEDURE — 90471 IMMUNIZATION ADMIN: CPT | Performed by: INTERNAL MEDICINE

## 2022-04-02 ENCOUNTER — HOSPITAL ENCOUNTER (OUTPATIENT)
Age: 47
Discharge: HOME | End: 2022-04-02
Attending: EMERGENCY MEDICINE
Payer: COMMERCIAL

## 2022-04-02 ENCOUNTER — APPOINTMENT (OUTPATIENT)
Dept: CT IMAGING | Age: 47
End: 2022-04-02
Attending: EMERGENCY MEDICINE
Payer: COMMERCIAL

## 2022-04-02 VITALS
DIASTOLIC BLOOD PRESSURE: 84 MMHG | HEART RATE: 63 BPM | RESPIRATION RATE: 20 BRPM | SYSTOLIC BLOOD PRESSURE: 119 MMHG | OXYGEN SATURATION: 99 % | HEIGHT: 70 IN | TEMPERATURE: 98 F | BODY MASS INDEX: 25.34 KG/M2 | WEIGHT: 177 LBS

## 2022-04-02 DIAGNOSIS — R42 DIZZINESS: Primary | ICD-10-CM

## 2022-04-02 LAB
#MXD IC: 0.4 X10ˆ3/UL (ref 0.1–1)
ATRIAL RATE: 62 BPM
BUN BLD-MCNC: 14 MG/DL (ref 7–18)
CHLORIDE BLD-SCNC: 101 MMOL/L (ref 98–112)
CO2 BLD-SCNC: 23 MMOL/L (ref 21–32)
CREAT BLD-MCNC: 0.7 MG/DL
GLUCOSE BLD-MCNC: 190 MG/DL (ref 70–99)
HCT VFR BLD AUTO: 44.6 %
HCT VFR BLD CALC: 44 %
HGB BLD-MCNC: 15.1 G/DL
ISTAT IONIZED CALCIUM FOR CHEM 8: 1.14 MMOL/L (ref 1.12–1.32)
LYMPHOCYTES # BLD AUTO: 2.2 X10ˆ3/UL (ref 1–4)
LYMPHOCYTES NFR BLD AUTO: 36.5 %
MCH RBC QN AUTO: 29.8 PG (ref 26–34)
MCHC RBC AUTO-ENTMCNC: 33.9 G/DL (ref 31–37)
MCV RBC AUTO: 88.1 FL (ref 80–100)
MIXED CELL %: 7 %
NEUTROPHILS # BLD AUTO: 3.5 X10ˆ3/UL (ref 1.5–7.7)
NEUTROPHILS NFR BLD AUTO: 56.5 %
P AXIS: 71 DEGREES
P-R INTERVAL: 172 MS
PLATELET # BLD AUTO: 281 X10ˆ3/UL (ref 150–450)
POTASSIUM BLD-SCNC: 4.2 MMOL/L (ref 3.6–5.1)
Q-T INTERVAL: 404 MS
QRS DURATION: 84 MS
QTC CALCULATION (BEZET): 410 MS
R AXIS: 17 DEGREES
RBC # BLD AUTO: 5.06 X10ˆ6/UL
SODIUM BLD-SCNC: 138 MMOL/L (ref 136–145)
TROPONIN I BLD-MCNC: <0.02 NG/ML
VENTRICULAR RATE: 62 BPM
WBC # BLD AUTO: 6.1 X10ˆ3/UL (ref 4–11)

## 2022-04-02 PROCEDURE — 99215 OFFICE O/P EST HI 40 MIN: CPT

## 2022-04-02 PROCEDURE — 84484 ASSAY OF TROPONIN QUANT: CPT

## 2022-04-02 PROCEDURE — 93010 ELECTROCARDIOGRAM REPORT: CPT

## 2022-04-02 PROCEDURE — 96360 HYDRATION IV INFUSION INIT: CPT

## 2022-04-02 PROCEDURE — 80047 BASIC METABLC PNL IONIZED CA: CPT

## 2022-04-02 PROCEDURE — 99214 OFFICE O/P EST MOD 30 MIN: CPT

## 2022-04-02 PROCEDURE — 85025 COMPLETE CBC W/AUTO DIFF WBC: CPT | Performed by: EMERGENCY MEDICINE

## 2022-04-02 PROCEDURE — 93005 ELECTROCARDIOGRAM TRACING: CPT

## 2022-04-02 PROCEDURE — 70450 CT HEAD/BRAIN W/O DYE: CPT | Performed by: EMERGENCY MEDICINE

## 2022-04-02 RX ORDER — MECLIZINE HYDROCHLORIDE CHEWABLE TABLETS 25 MG/1
25 TABLET, CHEWABLE ORAL 3 TIMES DAILY
Qty: 20 TABLET | Refills: 0 | Status: SHIPPED | OUTPATIENT
Start: 2022-04-02 | End: 2022-05-02

## 2022-04-02 RX ORDER — MECLIZINE HCL 12.5 MG/1
12.5 TABLET ORAL ONCE
Status: DISCONTINUED | OUTPATIENT
Start: 2022-04-02 | End: 2022-04-02

## 2022-04-02 RX ORDER — SODIUM CHLORIDE 9 MG/ML
1000 INJECTION, SOLUTION INTRAVENOUS ONCE
Status: COMPLETED | OUTPATIENT
Start: 2022-04-02 | End: 2022-04-02

## 2022-04-02 RX ORDER — MECLIZINE HCL 12.5 MG/1
12.5 TABLET ORAL ONCE
Status: COMPLETED | OUTPATIENT
Start: 2022-04-02 | End: 2022-04-02

## 2022-04-06 LAB
ATRIAL RATE: 62 BPM
P AXIS: 71 DEGREES
P-R INTERVAL: 172 MS
Q-T INTERVAL: 404 MS
QRS DURATION: 84 MS
QTC CALCULATION (BEZET): 410 MS
R AXIS: 17 DEGREES
T AXIS: 18 DEGREES
VENTRICULAR RATE: 62 BPM

## 2022-04-16 ENCOUNTER — HOSPITAL ENCOUNTER (OUTPATIENT)
Age: 47
Discharge: HOME | End: 2022-04-16
Payer: COMMERCIAL

## 2022-04-16 VITALS
BODY MASS INDEX: 27.2 KG/M2 | OXYGEN SATURATION: 98 % | SYSTOLIC BLOOD PRESSURE: 133 MMHG | RESPIRATION RATE: 20 BRPM | HEIGHT: 70 IN | WEIGHT: 190 LBS | TEMPERATURE: 98 F | HEART RATE: 94 BPM | DIASTOLIC BLOOD PRESSURE: 70 MMHG

## 2022-04-16 DIAGNOSIS — Z91.89 AT INCREASED RISK FOR STRESS: ICD-10-CM

## 2022-04-16 DIAGNOSIS — J06.9 VIRAL URI: Primary | ICD-10-CM

## 2022-04-16 LAB
S PYO AG THROAT QL: NEGATIVE
SARS-COV-2 RNA RESP QL NAA+PROBE: NOT DETECTED

## 2022-04-16 PROCEDURE — 87880 STREP A ASSAY W/OPTIC: CPT

## 2022-04-16 PROCEDURE — 99213 OFFICE O/P EST LOW 20 MIN: CPT

## 2022-04-16 PROCEDURE — 99212 OFFICE O/P EST SF 10 MIN: CPT

## 2022-04-16 NOTE — ED INITIAL ASSESSMENT (HPI)
Patient reports per  number [id number], patient reports burning in his throat, nose, headache and a sore throat. Patient reports symptoms for about 3 days.

## 2022-06-07 ENCOUNTER — OFFICE VISIT (OUTPATIENT)
Dept: INTERNAL MEDICINE CLINIC | Facility: CLINIC | Age: 47
End: 2022-06-07
Payer: COMMERCIAL

## 2022-06-07 VITALS
HEART RATE: 80 BPM | RESPIRATION RATE: 20 BRPM | BODY MASS INDEX: 25.65 KG/M2 | SYSTOLIC BLOOD PRESSURE: 120 MMHG | WEIGHT: 179.19 LBS | TEMPERATURE: 98 F | OXYGEN SATURATION: 97 % | DIASTOLIC BLOOD PRESSURE: 70 MMHG | HEIGHT: 70 IN

## 2022-06-07 DIAGNOSIS — F17.200 TOBACCO DEPENDENCE: ICD-10-CM

## 2022-06-07 DIAGNOSIS — K51.20 ULCERATIVE PROCTITIS WITHOUT COMPLICATION (HCC): ICD-10-CM

## 2022-06-07 DIAGNOSIS — Z63.4 BEREAVEMENT: ICD-10-CM

## 2022-06-07 DIAGNOSIS — E11.9 TYPE 2 DIABETES MELLITUS WITHOUT COMPLICATION, WITHOUT LONG-TERM CURRENT USE OF INSULIN (HCC): Primary | ICD-10-CM

## 2022-06-07 DIAGNOSIS — L81.9 HYPERPIGMENTATION: ICD-10-CM

## 2022-06-07 LAB
CARTRIDGE LOT#: 962 NUMERIC
HEMOGLOBIN A1C: 7 % (ref 4.3–5.6)

## 2022-06-07 PROCEDURE — 3078F DIAST BP <80 MM HG: CPT | Performed by: INTERNAL MEDICINE

## 2022-06-07 PROCEDURE — 3074F SYST BP LT 130 MM HG: CPT | Performed by: INTERNAL MEDICINE

## 2022-06-07 PROCEDURE — 3008F BODY MASS INDEX DOCD: CPT | Performed by: INTERNAL MEDICINE

## 2022-06-07 PROCEDURE — 3051F HG A1C>EQUAL 7.0%<8.0%: CPT | Performed by: INTERNAL MEDICINE

## 2022-06-07 PROCEDURE — 99214 OFFICE O/P EST MOD 30 MIN: CPT | Performed by: INTERNAL MEDICINE

## 2022-06-07 PROCEDURE — 83036 HEMOGLOBIN GLYCOSYLATED A1C: CPT | Performed by: INTERNAL MEDICINE

## 2022-06-07 SDOH — SOCIAL STABILITY - SOCIAL INSECURITY: DISSAPEARANCE AND DEATH OF FAMILY MEMBER: Z63.4

## 2022-06-07 NOTE — PATIENT INSTRUCTIONS
Individual therapy:     [facility]   [address] [place], [place], [address]   [address] [facility], [address]  [phone]      Psychiatry/medication management:    [address]   [address] [place], [place], [address]  [facility]  [phone]    Anastasiia Donovan MD  [address]""   [address]  [address]  [address] [place], [place], [address]    [facility] [address]   [address] [facility], [facility]  [phone]

## 2022-07-29 ENCOUNTER — HOSPITAL ENCOUNTER (OUTPATIENT)
Age: 47
Discharge: HOME | End: 2022-07-29
Attending: EMERGENCY MEDICINE
Payer: OTHER MISCELLANEOUS

## 2022-07-29 ENCOUNTER — APPOINTMENT (OUTPATIENT)
Dept: GENERAL RADIOLOGY | Age: 47
End: 2022-07-29
Attending: EMERGENCY MEDICINE
Payer: OTHER MISCELLANEOUS

## 2022-07-29 VITALS
HEART RATE: 82 BPM | BODY MASS INDEX: 25.77 KG/M2 | TEMPERATURE: 99 F | RESPIRATION RATE: 20 BRPM | OXYGEN SATURATION: 97 % | WEIGHT: 180 LBS | DIASTOLIC BLOOD PRESSURE: 78 MMHG | HEIGHT: 70 IN | SYSTOLIC BLOOD PRESSURE: 126 MMHG

## 2022-07-29 DIAGNOSIS — S96.911A STRAIN OF RIGHT FOOT, INITIAL ENCOUNTER: Primary | ICD-10-CM

## 2022-07-29 PROCEDURE — 99213 OFFICE O/P EST LOW 20 MIN: CPT

## 2022-07-29 PROCEDURE — 99214 OFFICE O/P EST MOD 30 MIN: CPT

## 2022-07-29 PROCEDURE — 73630 X-RAY EXAM OF FOOT: CPT | Performed by: EMERGENCY MEDICINE

## 2022-07-29 RX ORDER — POLYETHYLENE GLYCOL 3350 17 G/17G
17 POWDER, FOR SOLUTION ORAL DAILY PRN
Qty: 12 EACH | Refills: 0 | Status: SHIPPED | OUTPATIENT
Start: 2022-07-29 | End: 2022-08-28

## 2022-07-29 RX ORDER — BISACODYL 10 MG
10 SUPPOSITORY, RECTAL RECTAL DAILY
Qty: 7 SUPPOSITORY | Refills: 0 | Status: SHIPPED | OUTPATIENT
Start: 2022-07-29 | End: 2022-08-05

## 2022-07-29 RX ORDER — CEFADROXIL 500 MG/1
500 CAPSULE ORAL 2 TIMES DAILY
Qty: 20 CAPSULE | Refills: 0 | Status: SHIPPED | OUTPATIENT
Start: 2022-07-29 | End: 2022-08-08

## 2022-07-29 NOTE — ED INITIAL ASSESSMENT (HPI)
Patient fell at work today at around 1000. Injured right foot/ankle. Has a small abrasion to the right shin. Denies head injury. 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

## 2022-08-02 ENCOUNTER — OFFICE VISIT (OUTPATIENT)
Dept: INTERNAL MEDICINE CLINIC | Facility: CLINIC | Age: 47
End: 2022-08-02
Payer: OTHER MISCELLANEOUS

## 2022-08-02 VITALS
TEMPERATURE: 98 F | RESPIRATION RATE: 16 BRPM | HEIGHT: 70 IN | HEART RATE: 76 BPM | OXYGEN SATURATION: 97 % | WEIGHT: 179.81 LBS | BODY MASS INDEX: 25.74 KG/M2 | DIASTOLIC BLOOD PRESSURE: 70 MMHG | SYSTOLIC BLOOD PRESSURE: 108 MMHG
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
## 2022-08-02 DIAGNOSIS — M79.671 RIGHT FOOT PAIN: Primary | ICD-10-CM

## 2022-08-02 PROCEDURE — 3078F DIAST BP <80 MM HG: CPT | Performed by: INTERNAL MEDICINE

## 2022-08-02 PROCEDURE — 99213 OFFICE O/P EST LOW 20 MIN: CPT | Performed by: INTERNAL MEDICINE

## 2022-08-02 PROCEDURE — 3074F SYST BP LT 130 MM HG: CPT | Performed by: INTERNAL MEDICINE

## 2022-08-02 PROCEDURE — 3008F BODY MASS INDEX DOCD: CPT | Performed by: INTERNAL MEDICINE

## 2022-08-02 RX ORDER — NAPROXEN 500 MG/1
500 TABLET ORAL 2 TIMES DAILY WITH MEALS
Qty: 10 TABLET | Refills: 0 | Status: SHIPPED | OUTPATIENT
Start: 2022-08-02 | End: 2022-08-07

## 2022-08-02 RX ORDER — OMEPRAZOLE 40 MG/1
40 CAPSULE, DELAYED RELEASE ORAL DAILY
Qty: 30 CAPSULE | Refills: 0 | Status: SHIPPED | OUTPATIENT
Start: 2022-08-02

## 2022-08-03 ENCOUNTER — LAB ENCOUNTER (OUTPATIENT)
Dept: LAB | Age: 47
End: 2022-08-03
Attending: INTERNAL MEDICINE
Payer: OTHER MISCELLANEOUS

## 2022-08-03 DIAGNOSIS — E11.9 TYPE 2 DIABETES MELLITUS WITHOUT COMPLICATION, WITHOUT LONG-TERM CURRENT USE OF INSULIN (HCC): ICD-10-CM

## 2022-08-03 LAB
CREAT UR-SCNC: 169 MG/DL
MICROALBUMIN UR-MCNC: 1.23 MG/DL
MICROALBUMIN/CREAT 24H UR-RTO: 7.3 UG/MG (ref ?–30)

## 2022-08-03 PROCEDURE — 3061F NEG MICROALBUMINURIA REV: CPT | Performed by: INTERNAL MEDICINE

## 2022-08-03 PROCEDURE — 82570 ASSAY OF URINE CREATININE: CPT | Performed by: INTERNAL MEDICINE

## 2022-08-03 PROCEDURE — 82043 UR ALBUMIN QUANTITATIVE: CPT | Performed by: INTERNAL MEDICINE

## 2022-10-26 ENCOUNTER — OFFICE VISIT (OUTPATIENT)
Dept: INTERNAL MEDICINE CLINIC | Facility: CLINIC | Age: 47
End: 2022-10-26
Payer: COMMERCIAL

## 2022-10-26 VITALS
BODY MASS INDEX: 25.34 KG/M2 | OXYGEN SATURATION: 98 % | HEIGHT: 70 IN | DIASTOLIC BLOOD PRESSURE: 64 MMHG | RESPIRATION RATE: 16 BRPM | WEIGHT: 177 LBS | TEMPERATURE: 99 F | SYSTOLIC BLOOD PRESSURE: 90 MMHG | HEART RATE: 68 BPM

## 2022-10-26 DIAGNOSIS — E11.69 HYPERLIPIDEMIA ASSOCIATED WITH TYPE 2 DIABETES MELLITUS (HCC): ICD-10-CM

## 2022-10-26 DIAGNOSIS — Z23 IMMUNIZATION DUE: ICD-10-CM

## 2022-10-26 DIAGNOSIS — E78.5 HYPERLIPIDEMIA ASSOCIATED WITH TYPE 2 DIABETES MELLITUS (HCC): ICD-10-CM

## 2022-10-26 DIAGNOSIS — F17.200 TOBACCO DEPENDENCE: ICD-10-CM

## 2022-10-26 DIAGNOSIS — E11.9 TYPE 2 DIABETES MELLITUS WITHOUT COMPLICATION, WITHOUT LONG-TERM CURRENT USE OF INSULIN (HCC): Primary | ICD-10-CM

## 2022-10-26 LAB
CARTRIDGE EXPIRATION DATE: 7 DATE
CARTRIDGE LOT#: 928 NUMERIC
HEMOGLOBIN A1C: 7 % (ref 4.3–5.6)

## 2022-10-26 PROCEDURE — 90746 HEPB VACCINE 3 DOSE ADULT IM: CPT | Performed by: INTERNAL MEDICINE

## 2022-10-26 PROCEDURE — 3051F HG A1C>EQUAL 7.0%<8.0%: CPT | Performed by: INTERNAL MEDICINE

## 2022-10-26 PROCEDURE — 90472 IMMUNIZATION ADMIN EACH ADD: CPT | Performed by: INTERNAL MEDICINE

## 2022-10-26 PROCEDURE — 3008F BODY MASS INDEX DOCD: CPT | Performed by: INTERNAL MEDICINE

## 2022-10-26 PROCEDURE — 99214 OFFICE O/P EST MOD 30 MIN: CPT | Performed by: INTERNAL MEDICINE

## 2022-10-26 PROCEDURE — 90686 IIV4 VACC NO PRSV 0.5 ML IM: CPT | Performed by: INTERNAL MEDICINE

## 2022-10-26 PROCEDURE — 83036 HEMOGLOBIN GLYCOSYLATED A1C: CPT | Performed by: INTERNAL MEDICINE

## 2022-10-26 PROCEDURE — 90471 IMMUNIZATION ADMIN: CPT | Performed by: INTERNAL MEDICINE

## 2022-10-26 PROCEDURE — 3074F SYST BP LT 130 MM HG: CPT | Performed by: INTERNAL MEDICINE

## 2022-10-26 PROCEDURE — 3078F DIAST BP <80 MM HG: CPT | Performed by: INTERNAL MEDICINE

## 2022-10-31 ENCOUNTER — TELEPHONE (OUTPATIENT)
Dept: INTERNAL MEDICINE CLINIC | Facility: CLINIC | Age: 47
End: 2022-10-31

## 2023-02-03 RX ORDER — ATORVASTATIN CALCIUM 20 MG/1
TABLET, FILM COATED ORAL
Qty: 90 TABLET | Refills: 3 | Status: SHIPPED | OUTPATIENT
Start: 2023-02-03

## 2023-02-15 ENCOUNTER — OFFICE VISIT (OUTPATIENT)
Dept: INTERNAL MEDICINE CLINIC | Facility: CLINIC | Age: 48
End: 2023-02-15
Payer: COMMERCIAL

## 2023-02-15 VITALS
OXYGEN SATURATION: 98 % | TEMPERATURE: 98 F | BODY MASS INDEX: 25 KG/M2 | RESPIRATION RATE: 15 BRPM | WEIGHT: 177.19 LBS | HEART RATE: 75 BPM | SYSTOLIC BLOOD PRESSURE: 120 MMHG | DIASTOLIC BLOOD PRESSURE: 70 MMHG

## 2023-02-15 DIAGNOSIS — Z01.89 ROUTINE LAB DRAW: ICD-10-CM

## 2023-02-15 DIAGNOSIS — Z23 IMMUNIZATION DUE: ICD-10-CM

## 2023-02-15 DIAGNOSIS — F43.21 COMPLICATED GRIEVING: ICD-10-CM

## 2023-02-15 DIAGNOSIS — E78.5 HYPERLIPIDEMIA ASSOCIATED WITH TYPE 2 DIABETES MELLITUS (HCC): ICD-10-CM

## 2023-02-15 DIAGNOSIS — F17.200 TOBACCO DEPENDENCE: ICD-10-CM

## 2023-02-15 DIAGNOSIS — M17.10 ARTHRITIS OF KNEE: ICD-10-CM

## 2023-02-15 DIAGNOSIS — E11.9 TYPE 2 DIABETES MELLITUS WITHOUT COMPLICATION, WITHOUT LONG-TERM CURRENT USE OF INSULIN (HCC): Primary | ICD-10-CM

## 2023-02-15 DIAGNOSIS — E11.69 HYPERLIPIDEMIA ASSOCIATED WITH TYPE 2 DIABETES MELLITUS (HCC): ICD-10-CM

## 2023-02-15 DIAGNOSIS — K51.20 ULCERATIVE PROCTITIS WITHOUT COMPLICATION (HCC): ICD-10-CM

## 2023-02-15 LAB
CARTRIDGE LOT#: ABNORMAL NUMERIC
HEMOGLOBIN A1C: 6.6 % (ref 4.3–5.6)

## 2023-02-15 PROCEDURE — 90471 IMMUNIZATION ADMIN: CPT | Performed by: INTERNAL MEDICINE

## 2023-02-15 PROCEDURE — 90677 PCV20 VACCINE IM: CPT | Performed by: INTERNAL MEDICINE

## 2023-02-15 PROCEDURE — 3078F DIAST BP <80 MM HG: CPT | Performed by: INTERNAL MEDICINE

## 2023-02-15 PROCEDURE — 3044F HG A1C LEVEL LT 7.0%: CPT | Performed by: INTERNAL MEDICINE

## 2023-02-15 PROCEDURE — 83036 HEMOGLOBIN GLYCOSYLATED A1C: CPT | Performed by: INTERNAL MEDICINE

## 2023-02-15 PROCEDURE — 3074F SYST BP LT 130 MM HG: CPT | Performed by: INTERNAL MEDICINE

## 2023-02-15 PROCEDURE — 99214 OFFICE O/P EST MOD 30 MIN: CPT | Performed by: INTERNAL MEDICINE

## 2023-02-15 NOTE — PATIENT INSTRUCTIONS
Individual therapy:     [facility]   [address] [place], [place], [address]   [address] [facility], [address]  [address] [place] [address]   [address] [facility]  P: [phone]        Psychiatry/medication management:    [facility] [address] [place], [place], [address]  Call [facility]  [phone]    Almaz Michelle MD  [address]   [address]  [address]  [address] [place], [place], [address]    [address]   [address] [facility], [address] [place] [address]  [phone]

## 2023-03-15 ENCOUNTER — PATIENT MESSAGE (OUTPATIENT)
Dept: INTERNAL MEDICINE CLINIC | Facility: CLINIC | Age: 48
End: 2023-03-15

## 2023-03-15 ENCOUNTER — LAB ENCOUNTER (OUTPATIENT)
Dept: LAB | Age: 48
End: 2023-03-15
Attending: INTERNAL MEDICINE
Payer: COMMERCIAL

## 2023-03-15 DIAGNOSIS — E55.9 VITAMIN D DEFICIENCY: Primary | ICD-10-CM

## 2023-03-15 DIAGNOSIS — E78.5 HYPERLIPIDEMIA ASSOCIATED WITH TYPE 2 DIABETES MELLITUS (HCC): ICD-10-CM

## 2023-03-15 DIAGNOSIS — E11.69 HYPERLIPIDEMIA ASSOCIATED WITH TYPE 2 DIABETES MELLITUS (HCC): ICD-10-CM

## 2023-03-15 DIAGNOSIS — E11.9 TYPE 2 DIABETES MELLITUS WITHOUT COMPLICATION, WITHOUT LONG-TERM CURRENT USE OF INSULIN (HCC): ICD-10-CM

## 2023-03-15 DIAGNOSIS — Z01.89 ROUTINE LAB DRAW: ICD-10-CM

## 2023-03-15 LAB
ALBUMIN SERPL-MCNC: 4 G/DL (ref 3.4–5)
ALBUMIN/GLOB SERPL: 1.2 {RATIO} (ref 1–2)
ALP LIVER SERPL-CCNC: 92 U/L
ALT SERPL-CCNC: 22 U/L
ANION GAP SERPL CALC-SCNC: 6 MMOL/L (ref 0–18)
AST SERPL-CCNC: 6 U/L (ref 15–37)
BASOPHILS # BLD AUTO: 0.03 X10(3) UL (ref 0–0.2)
BASOPHILS NFR BLD AUTO: 0.5 %
BILIRUB SERPL-MCNC: 0.4 MG/DL (ref 0.1–2)
BUN BLD-MCNC: 11 MG/DL (ref 7–18)
CALCIUM BLD-MCNC: 8.6 MG/DL (ref 8.5–10.1)
CHLORIDE SERPL-SCNC: 106 MMOL/L (ref 98–112)
CHOLEST SERPL-MCNC: 91 MG/DL (ref ?–200)
CO2 SERPL-SCNC: 26 MMOL/L (ref 21–32)
COMPLEXED PSA SERPL-MCNC: 0.4 NG/ML (ref ?–4)
CREAT BLD-MCNC: 0.82 MG/DL
CREAT UR-SCNC: 95.8 MG/DL
EOSINOPHIL # BLD AUTO: 0.12 X10(3) UL (ref 0–0.7)
EOSINOPHIL NFR BLD AUTO: 2.2 %
ERYTHROCYTE [DISTWIDTH] IN BLOOD BY AUTOMATED COUNT: 12.9 %
FASTING PATIENT LIPID ANSWER: YES
FASTING STATUS PATIENT QL REPORTED: YES
GFR SERPLBLD BASED ON 1.73 SQ M-ARVRAT: 108 ML/MIN/1.73M2 (ref 60–?)
GLOBULIN PLAS-MCNC: 3.4 G/DL (ref 2.8–4.4)
GLUCOSE BLD-MCNC: 134 MG/DL (ref 70–99)
HCT VFR BLD AUTO: 45.9 %
HDLC SERPL-MCNC: 38 MG/DL (ref 40–59)
HGB BLD-MCNC: 15.6 G/DL
IMM GRANULOCYTES # BLD AUTO: 0.03 X10(3) UL (ref 0–1)
IMM GRANULOCYTES NFR BLD: 0.5 %
LDLC SERPL CALC-MCNC: 30 MG/DL (ref ?–100)
LYMPHOCYTES # BLD AUTO: 2.66 X10(3) UL (ref 1–4)
LYMPHOCYTES NFR BLD AUTO: 48.2 %
MCH RBC QN AUTO: 29.9 PG (ref 26–34)
MCHC RBC AUTO-ENTMCNC: 34 G/DL (ref 31–37)
MCV RBC AUTO: 88.1 FL
MICROALBUMIN UR-MCNC: 0.62 MG/DL
MICROALBUMIN/CREAT 24H UR-RTO: 6.5 UG/MG (ref ?–30)
MONOCYTES # BLD AUTO: 0.39 X10(3) UL (ref 0.1–1)
MONOCYTES NFR BLD AUTO: 7.1 %
NEUTROPHILS # BLD AUTO: 2.29 X10 (3) UL (ref 1.5–7.7)
NEUTROPHILS # BLD AUTO: 2.29 X10(3) UL (ref 1.5–7.7)
NEUTROPHILS NFR BLD AUTO: 41.5 %
NONHDLC SERPL-MCNC: 53 MG/DL (ref ?–130)
OSMOLALITY SERPL CALC.SUM OF ELEC: 287 MOSM/KG (ref 275–295)
PLATELET # BLD AUTO: 285 10(3)UL (ref 150–450)
POTASSIUM SERPL-SCNC: 4.3 MMOL/L (ref 3.5–5.1)
PROT SERPL-MCNC: 7.4 G/DL (ref 6.4–8.2)
RBC # BLD AUTO: 5.21 X10(6)UL
SODIUM SERPL-SCNC: 138 MMOL/L (ref 136–145)
TRIGL SERPL-MCNC: 130 MG/DL (ref 30–149)
TSI SER-ACNC: 1.56 MIU/ML (ref 0.36–3.74)
VIT D+METAB SERPL-MCNC: 13.3 NG/ML (ref 30–100)
VLDLC SERPL CALC-MCNC: 17 MG/DL (ref 0–30)
WBC # BLD AUTO: 5.5 X10(3) UL (ref 4–11)

## 2023-03-15 PROCEDURE — 36415 COLL VENOUS BLD VENIPUNCTURE: CPT

## 2023-03-15 PROCEDURE — 3061F NEG MICROALBUMINURIA REV: CPT | Performed by: INTERNAL MEDICINE

## 2023-03-15 PROCEDURE — 82043 UR ALBUMIN QUANTITATIVE: CPT

## 2023-03-15 PROCEDURE — 84443 ASSAY THYROID STIM HORMONE: CPT

## 2023-03-15 PROCEDURE — 82570 ASSAY OF URINE CREATININE: CPT

## 2023-03-15 PROCEDURE — 80061 LIPID PANEL: CPT

## 2023-03-15 PROCEDURE — 85025 COMPLETE CBC W/AUTO DIFF WBC: CPT

## 2023-03-15 PROCEDURE — 82306 VITAMIN D 25 HYDROXY: CPT

## 2023-03-15 PROCEDURE — 80053 COMPREHEN METABOLIC PANEL: CPT

## 2023-03-15 RX ORDER — ERGOCALCIFEROL 1.25 MG/1
50000 CAPSULE ORAL WEEKLY
Qty: 12 CAPSULE | Refills: 0 | Status: SHIPPED | OUTPATIENT
Start: 2023-03-15 | End: 2023-06-01

## 2023-03-15 RX ORDER — ERGOCALCIFEROL 1.25 MG/1
50000 CAPSULE ORAL WEEKLY
Qty: 12 CAPSULE | Refills: 0 | Status: SHIPPED | OUTPATIENT
Start: 2023-03-15 | End: 2023-03-15

## 2023-03-15 NOTE — TELEPHONE ENCOUNTER
From: [facility]  To: Priyanka Lema MD  Sent: 3/15/2023 4:33 PM CDT  Subject: Question regarding MICROALB/CREAT RATIO, RANDOM URINE    Kwabena doctora tengo maría prejunta Sali un Poco bajo de AST rachel puedo contrlarlo que mullins Mon[place]illo alfredo es

## 2023-03-15 NOTE — TELEPHONE ENCOUNTER
From: Lynn Like  Sent: 3/15/2023 3:39 PM CDT  To: [facility] Clinical Staff  Subject: your follow-up with Dr. Martin Segura mi AST u/l ESTA un Poco bajo rachel SE puede controlar

## 2023-06-06 ENCOUNTER — OFFICE VISIT (OUTPATIENT)
Dept: INTERNAL MEDICINE CLINIC | Facility: CLINIC | Age: 48
End: 2023-06-06
Payer: COMMERCIAL

## 2023-06-06 ENCOUNTER — HOSPITAL ENCOUNTER (OUTPATIENT)
Dept: GENERAL RADIOLOGY | Age: 48
Discharge: HOME | End: 2023-06-06
Attending: INTERNAL MEDICINE
Payer: COMMERCIAL

## 2023-06-06 VITALS
WEIGHT: 177 LBS | BODY MASS INDEX: 25 KG/M2 | SYSTOLIC BLOOD PRESSURE: 120 MMHG | OXYGEN SATURATION: 98 % | TEMPERATURE: 98 F | DIASTOLIC BLOOD PRESSURE: 64 MMHG | HEART RATE: 64 BPM | RESPIRATION RATE: 15 BRPM

## 2023-06-06 DIAGNOSIS — G89.29 CHRONIC PAIN OF LEFT KNEE: ICD-10-CM

## 2023-06-06 DIAGNOSIS — M25.562 CHRONIC PAIN OF LEFT KNEE: ICD-10-CM

## 2023-06-06 DIAGNOSIS — E11.9 TYPE 2 DIABETES MELLITUS WITHOUT COMPLICATION, WITHOUT LONG-TERM CURRENT USE OF INSULIN (HCC): Primary | ICD-10-CM

## 2023-06-06 DIAGNOSIS — E55.9 VITAMIN D DEFICIENCY: ICD-10-CM

## 2023-06-06 DIAGNOSIS — K51.20 ULCERATIVE PROCTITIS WITHOUT COMPLICATION (HCC): ICD-10-CM

## 2023-06-06 DIAGNOSIS — M17.10 ARTHRITIS OF KNEE: ICD-10-CM

## 2023-06-06 LAB
CARTRIDGE LOT#: ABNORMAL NUMERIC
HEMOGLOBIN A1C: 6.7 % (ref 4.3–5.6)

## 2023-06-06 PROCEDURE — 3078F DIAST BP <80 MM HG: CPT | Performed by: INTERNAL MEDICINE

## 2023-06-06 PROCEDURE — 99214 OFFICE O/P EST MOD 30 MIN: CPT | Performed by: INTERNAL MEDICINE

## 2023-06-06 PROCEDURE — 73562 X-RAY EXAM OF KNEE 3: CPT | Performed by: INTERNAL MEDICINE

## 2023-06-06 PROCEDURE — 83036 HEMOGLOBIN GLYCOSYLATED A1C: CPT | Performed by: INTERNAL MEDICINE

## 2023-06-06 PROCEDURE — 3044F HG A1C LEVEL LT 7.0%: CPT | Performed by: INTERNAL MEDICINE

## 2023-06-06 PROCEDURE — 3074F SYST BP LT 130 MM HG: CPT | Performed by: INTERNAL MEDICINE

## 2023-06-08 ENCOUNTER — TELEPHONE (OUTPATIENT)
Dept: ORTHOPEDICS CLINIC | Facility: CLINIC | Age: 48
End: 2023-06-08

## 2023-06-08 DIAGNOSIS — M25.562 LEFT KNEE PAIN, UNSPECIFIED CHRONICITY: ICD-10-CM

## 2023-06-08 DIAGNOSIS — Z01.89 ENCOUNTER FOR LOWER EXTREMITY COMPARISON IMAGING STUDY: Primary | ICD-10-CM

## 2023-06-08 NOTE — TELEPHONE ENCOUNTER
Patient has an upcoming appointment for LT knee pain. Patient had diagnostic imaging on 6/6/23 and can be viewed in Epic. Please review imaging, and advise if further imaging is required. If, so please place RX accordingly.     Future Appointments   Date Time Provider Eli Bety   6/26/2023  2:00 PM Suellyn Osler, MD [facility] [id number]   9/6/2023  5:00 PM Serafin Erazo MD [facility] [place]   9/22/2023 12:30 PM Elissa Gates MD [facility] SUB GI

## 2023-06-08 NOTE — TELEPHONE ENCOUNTER
Scheduled. Please advise if Xrays are still needed.  Appt changed due to MRI appointment from Dr Harrison Vann   Date Time Provider Eli Bety   6/27/2023  4:15 PM [address] MR RM4 (3T WIDE) [address] [facility] MRI [facility] AT [facility]   6/28/2023 12:40 PM WDR XR RM2 WDR XRAY EDW [facility]   6/28/2023  1:10 PM WDR XR RM1 WDR XRAY EDW [facility]   6/28/2023  1:40 PM Batool Wills MD [facility] ORTHO Wo [id number]   9/6/2023  5:00 PM Regino Arreguin MD [facility] 8 [facility] [place]   9/22/2023 12:30 PM cJ Richardson MD [facility] SUB GI

## 2023-06-23 ENCOUNTER — TELEPHONE (OUTPATIENT)
Dept: INTERNAL MEDICINE CLINIC | Facility: CLINIC | Age: 48
End: 2023-06-23

## 2023-06-23 NOTE — TELEPHONE ENCOUNTER
Called pt to reschedule his 9/6 appt due to DV being out of town then, DV returning 9/12     Left a voicemail to callback to reschedule appt

## 2023-06-27 ENCOUNTER — HOSPITAL ENCOUNTER (OUTPATIENT)
Dept: MRI IMAGING | Facility: HOSPITAL | Age: 48
Discharge: HOME | End: 2023-06-27
Attending: INTERNAL MEDICINE
Payer: COMMERCIAL

## 2023-06-27 DIAGNOSIS — R93.6 ABNORMAL X-RAY OF KNEE: ICD-10-CM

## 2023-06-27 DIAGNOSIS — M25.562 LEFT KNEE PAIN, UNSPECIFIED CHRONICITY: ICD-10-CM

## 2023-06-27 PROCEDURE — 73721 MRI JNT OF LWR EXTRE W/O DYE: CPT | Performed by: INTERNAL MEDICINE

## 2023-06-28 ENCOUNTER — HOSPITAL ENCOUNTER (OUTPATIENT)
Dept: GENERAL RADIOLOGY | Age: 48
Discharge: HOME | End: 2023-06-28
Attending: ORTHOPAEDIC SURGERY
Payer: COMMERCIAL

## 2023-06-28 ENCOUNTER — OFFICE VISIT (OUTPATIENT)
Dept: ORTHOPEDICS CLINIC | Facility: CLINIC | Age: 48
End: 2023-06-28
Payer: COMMERCIAL

## 2023-06-28 VITALS — WEIGHT: 177 LBS | BODY MASS INDEX: 25.34 KG/M2 | HEIGHT: 70 IN

## 2023-06-28 DIAGNOSIS — M94.262 CHONDROMALACIA OF LEFT KNEE: ICD-10-CM

## 2023-06-28 DIAGNOSIS — S83.232A COMPLEX TEAR OF MEDIAL MENISCUS OF LEFT KNEE AS CURRENT INJURY, INITIAL ENCOUNTER: Primary | ICD-10-CM

## 2023-06-28 DIAGNOSIS — Z01.89 ENCOUNTER FOR LOWER EXTREMITY COMPARISON IMAGING STUDY: ICD-10-CM

## 2023-06-28 PROCEDURE — 99205 OFFICE O/P NEW HI 60 MIN: CPT | Performed by: ORTHOPAEDIC SURGERY

## 2023-06-28 PROCEDURE — 3008F BODY MASS INDEX DOCD: CPT | Performed by: ORTHOPAEDIC SURGERY

## 2023-06-28 PROCEDURE — 20610 DRAIN/INJ JOINT/BURSA W/O US: CPT | Performed by: ORTHOPAEDIC SURGERY

## 2023-06-28 RX ORDER — KETOROLAC TROMETHAMINE 30 MG/ML
30 INJECTION, SOLUTION INTRAMUSCULAR; INTRAVENOUS ONCE
Status: COMPLETED | OUTPATIENT
Start: 2023-06-28 | End: 2023-06-28

## 2023-06-28 RX ORDER — TRIAMCINOLONE ACETONIDE 40 MG/ML
40 INJECTION, SUSPENSION INTRA-ARTICULAR; INTRAMUSCULAR ONCE
Status: COMPLETED | OUTPATIENT
Start: 2023-06-28 | End: 2023-06-28

## 2023-06-28 RX ADMIN — TRIAMCINOLONE ACETONIDE 40 MG: 40 INJECTION, SUSPENSION INTRA-ARTICULAR; INTRAMUSCULAR at 14:15:00

## 2023-06-28 RX ADMIN — KETOROLAC TROMETHAMINE 30 MG: 30 INJECTION, SOLUTION INTRAMUSCULAR; INTRAVENOUS at 14:15:00

## 2023-07-06 ENCOUNTER — LAB ENCOUNTER (OUTPATIENT)
Dept: LAB | Age: 48
End: 2023-07-06
Attending: INTERNAL MEDICINE
Payer: COMMERCIAL

## 2023-07-06 DIAGNOSIS — E55.9 VITAMIN D DEFICIENCY: ICD-10-CM

## 2023-07-06 LAB — VIT D+METAB SERPL-MCNC: 46.9 NG/ML (ref 30–100)

## 2023-07-06 PROCEDURE — 82306 VITAMIN D 25 HYDROXY: CPT | Performed by: INTERNAL MEDICINE

## 2023-08-18 ENCOUNTER — TELEPHONE (OUTPATIENT)
Dept: INTERNAL MEDICINE CLINIC | Facility: CLINIC | Age: 48
End: 2023-08-18

## 2023-09-08 ENCOUNTER — MED REC SCAN ONLY (OUTPATIENT)
Dept: ORTHOPEDICS CLINIC | Facility: CLINIC | Age: 48
End: 2023-09-08

## 2023-09-18 ENCOUNTER — APPOINTMENT (OUTPATIENT)
Dept: CT IMAGING | Age: 48
End: 2023-09-18
Attending: NURSE PRACTITIONER
Payer: COMMERCIAL

## 2023-09-18 ENCOUNTER — HOSPITAL ENCOUNTER (OUTPATIENT)
Age: 48
Discharge: HOME | End: 2023-09-18
Payer: COMMERCIAL

## 2023-09-18 VITALS
RESPIRATION RATE: 18 BRPM | TEMPERATURE: 99 F | BODY MASS INDEX: 25.48 KG/M2 | SYSTOLIC BLOOD PRESSURE: 127 MMHG | HEIGHT: 70 IN | HEART RATE: 87 BPM | DIASTOLIC BLOOD PRESSURE: 87 MMHG | OXYGEN SATURATION: 99 % | WEIGHT: 178 LBS

## 2023-09-18 DIAGNOSIS — M54.50 ACUTE LEFT-SIDED LOW BACK PAIN WITHOUT SCIATICA: Primary | ICD-10-CM

## 2023-09-18 LAB
BILIRUB UR QL STRIP: NEGATIVE
CLARITY UR: CLEAR
COLOR UR: YELLOW
GLUCOSE UR STRIP-MCNC: NEGATIVE MG/DL
HGB UR QL STRIP: NEGATIVE
KETONES UR STRIP-MCNC: NEGATIVE MG/DL
LEUKOCYTE ESTERASE UR QL STRIP: NEGATIVE
NITRITE UR QL STRIP: NEGATIVE
PH UR STRIP: 5.5 [PH]
PROT UR STRIP-MCNC: NEGATIVE MG/DL
SP GR UR STRIP: >=1.03
UROBILINOGEN UR STRIP-ACNC: <2 MG/DL

## 2023-09-18 PROCEDURE — 81002 URINALYSIS NONAUTO W/O SCOPE: CPT

## 2023-09-18 PROCEDURE — 99214 OFFICE O/P EST MOD 30 MIN: CPT

## 2023-09-18 PROCEDURE — 74176 CT ABD & PELVIS W/O CONTRAST: CPT | Performed by: NURSE PRACTITIONER

## 2023-09-18 RX ORDER — CYCLOBENZAPRINE HCL 10 MG
10 TABLET ORAL 3 TIMES DAILY PRN
Qty: 20 TABLET | Refills: 0 | Status: SHIPPED | OUTPATIENT
Start: 2023-09-18 | End: 2023-09-25

## 2023-09-18 RX ORDER — PREDNISONE 20 MG/1
TABLET ORAL
Qty: 21 TABLET | Refills: 0 | Status: SHIPPED | OUTPATIENT
Start: 2023-09-18 | End: 2023-09-27 | Stop reason: ALTCHOICE

## 2023-09-18 NOTE — DISCHARGE INSTRUCTIONS
Back pain  Use the recommended pain medications regularly for the next 5 days   . Alternate heat and ice, apply heat for 20 to 30 minutes, 1 to 2 hours later apply ice for 20 to 30 minutes repeat this process while awake  Do the attached back exercises morning and night to help with muscle spasm  No heavy lifting over 15 lbs, or strenuous exercise until pain is resolved.   Followup with your physician in 48-72 hours  Return to ED immediately for any numbness or weakness in your legs, bowel or    bladder issues, fever, or persisting or worsening symptoms

## 2023-09-18 NOTE — ED INITIAL ASSESSMENT (HPI)
C/o left lower back pain for 1 month. Pt reports pain radiates for down leg but pt reports not that often. Pt denies injury, trauma, fall, working out. Pt reports he works in construction. Pt reports pain feels deeper than the muscle. Pt reports pain to flank area. Pt denies urinary symptoms. Pt denies hx of kidney stone. Pt denies otc meds used for pain.

## 2023-09-22 PROBLEM — Z86.010 HISTORY OF ADENOMATOUS POLYP OF COLON: Status: ACTIVE | Noted: 2023-09-22

## 2023-09-22 PROBLEM — K51.011 ULCERATIVE CHRONIC PANCOLITIS WITH RECTAL BLEEDING (HCC): Status: ACTIVE | Noted: 2023-09-22

## 2023-09-22 PROBLEM — Z86.0101 HISTORY OF ADENOMATOUS POLYP OF COLON: Status: ACTIVE | Noted: 2023-09-22

## 2023-09-27 ENCOUNTER — OFFICE VISIT (OUTPATIENT)
Dept: INTERNAL MEDICINE CLINIC | Facility: CLINIC | Age: 48
End: 2023-09-27
Payer: COMMERCIAL

## 2023-09-27 VITALS
OXYGEN SATURATION: 98 % | SYSTOLIC BLOOD PRESSURE: 120 MMHG | HEART RATE: 69 BPM | BODY MASS INDEX: 25 KG/M2 | RESPIRATION RATE: 15 BRPM | WEIGHT: 177.19 LBS | DIASTOLIC BLOOD PRESSURE: 60 MMHG | TEMPERATURE: 98 F

## 2023-09-27 DIAGNOSIS — E11.9 TYPE 2 DIABETES MELLITUS WITHOUT COMPLICATION, WITHOUT LONG-TERM CURRENT USE OF INSULIN (HCC): Primary | ICD-10-CM

## 2023-09-27 DIAGNOSIS — E78.5 HYPERLIPIDEMIA, UNSPECIFIED HYPERLIPIDEMIA TYPE: ICD-10-CM

## 2023-09-27 DIAGNOSIS — Z23 IMMUNIZATION DUE: ICD-10-CM

## 2023-09-27 DIAGNOSIS — B36.0 TINEA VERSICOLOR: ICD-10-CM

## 2023-09-27 LAB
CARTRIDGE LOT#: 7063 NUMERIC
HEMOGLOBIN A1C: 7.3 % (ref 4.3–5.6)

## 2023-09-27 PROCEDURE — 3051F HG A1C>EQUAL 7.0%<8.0%: CPT | Performed by: INTERNAL MEDICINE

## 2023-09-27 PROCEDURE — 3074F SYST BP LT 130 MM HG: CPT | Performed by: INTERNAL MEDICINE

## 2023-09-27 PROCEDURE — 90471 IMMUNIZATION ADMIN: CPT | Performed by: INTERNAL MEDICINE

## 2023-09-27 PROCEDURE — 83036 HEMOGLOBIN GLYCOSYLATED A1C: CPT | Performed by: INTERNAL MEDICINE

## 2023-09-27 PROCEDURE — 3078F DIAST BP <80 MM HG: CPT | Performed by: INTERNAL MEDICINE

## 2023-09-27 PROCEDURE — 90686 IIV4 VACC NO PRSV 0.5 ML IM: CPT | Performed by: INTERNAL MEDICINE

## 2023-09-27 PROCEDURE — 99214 OFFICE O/P EST MOD 30 MIN: CPT | Performed by: INTERNAL MEDICINE

## 2023-09-27 RX ORDER — KETOCONAZOLE 20 MG/G
1 CREAM TOPICAL DAILY
Qty: 60 G | Refills: 1 | Status: SHIPPED | OUTPATIENT
Start: 2023-09-27 | End: 2023-10-11

## 2023-11-02 NOTE — TELEPHONE ENCOUNTER
PROTOCOL PASSED    Name from pharmacy: metFORMIN HCl 1000 MG Oral Tablet         Will file in chart as: METFORMIN HCL 1000 MG Oral Tab    Sig: TOME 1 TABLETA POR LA BOCA  DOS VECES AL ALIREZA CON LAS  COMIDAS    Original sig: TOME 1 TABLETA POR LA BOCA DOS  VECES AL ALIREZA CON LAS COMIDAS    Disp: 180 tablet    Refills: 3    Start: 11/2/2023    Class: Normal    Non-formulary    To pharmacy: Please send a replace/new response with 90-Day Supply if appropriate to maximize member benefit. Requesting 1 year supply.     Last ordered: 10 months ago (12/8/2022) by Chris Lyn MD    Last refill: 9/7/2023    Rx #: [id number]    Diabetic Medication Protocol [id number]/02/2023 05:20 AM   Protocol Details HgBA1C procedure resulted in past 6 months    Last HgBA1C < 7.5    Microalbumin procedure in past 12 months or taking ACE/ARB    Appointment in past 6 or next 3 months      To be filled at: [facility], [address] [phone], [address]: 09/27/23 w/ DV   RTC: 12/27/23   RECENT LABS: 09/27/23   FOV: 12/27/23

## 2024-01-08 RX ORDER — ATORVASTATIN CALCIUM 20 MG/1
20 TABLET, FILM COATED ORAL NIGHTLY
Qty: 90 TABLET | Refills: 3 | Status: SHIPPED | OUTPATIENT
Start: 2024-01-08

## 2024-01-08 NOTE — TELEPHONE ENCOUNTER
Requesting    Name from pharmacy: Atorvastatin Calcium 20 MG Oral Tablet         Will file in chart as: ATORVASTATIN 20 MG Oral Tab    Sig: TOME 1 TABLETA POR LA BOCA  CADA NOCHE    Original sig: TOME 1 TABLETA POR LA BOCA CADA  NOCHE    Disp: 90 tablet    Refills: 3    Start: 1/5/2024    Class: Normal    Non-formulary    To pharmacy: Please send a replace/new response with 90-Day Supply if appropriate to maximize member benefit. Requesting 1 year supply.    Last ordered: 11 months ago (2/3/2023) by Aliza Nugent MD    Last refill: 11/11/2023    Rx #: [id number]    Cholesterol Medication Protocol Kzwdjp4301/05/2024 10:41 PM   Protocol Details ALT < 80    ALT resulted within past year    Lipid panel within past 12 months    Appointment within past 12 or next 3 months           LOV: 9/27/2023  RTC: 3 months  Last Relevant Labs: 3/15/2023  Filled: 2/3/2023 #90 with 3 refills    Future Appointments   Date Time Provider Department Center   1/17/2024  4:20 PM Ayah Urbina MD [facility] [place]

## 2024-01-17 ENCOUNTER — OFFICE VISIT (OUTPATIENT)
Dept: INTERNAL MEDICINE CLINIC | Facility: CLINIC | Age: 49
End: 2024-01-17
Payer: COMMERCIAL

## 2024-01-17 VITALS
HEIGHT: 68.9 IN | DIASTOLIC BLOOD PRESSURE: 70 MMHG | TEMPERATURE: 98 F | SYSTOLIC BLOOD PRESSURE: 128 MMHG | BODY MASS INDEX: 25.89 KG/M2 | WEIGHT: 174.81 LBS | OXYGEN SATURATION: 98 % | HEART RATE: 80 BPM | RESPIRATION RATE: 16 BRPM

## 2024-01-17 DIAGNOSIS — E11.9 TYPE 2 DIABETES MELLITUS WITHOUT COMPLICATION, WITHOUT LONG-TERM CURRENT USE OF INSULIN (HCC): Primary | ICD-10-CM

## 2024-01-17 DIAGNOSIS — S83.242A TEAR OF MEDIAL MENISCUS OF LEFT KNEE, UNSPECIFIED TEAR TYPE, UNSPECIFIED WHETHER OLD OR CURRENT TEAR, INITIAL ENCOUNTER: ICD-10-CM

## 2024-01-17 DIAGNOSIS — E11.69 HYPERLIPIDEMIA ASSOCIATED WITH TYPE 2 DIABETES MELLITUS  (HCC): ICD-10-CM

## 2024-01-17 DIAGNOSIS — E78.5 HYPERLIPIDEMIA ASSOCIATED WITH TYPE 2 DIABETES MELLITUS  (HCC): ICD-10-CM

## 2024-01-17 DIAGNOSIS — B36.0 TINEA VERSICOLOR: ICD-10-CM

## 2024-01-17 DIAGNOSIS — Z01.89 ROUTINE LAB DRAW: ICD-10-CM

## 2024-01-17 PROBLEM — S83.249A MEDIAL MENISCUS TEAR: Status: ACTIVE | Noted: 2024-01-17

## 2024-01-17 PROCEDURE — 99214 OFFICE O/P EST MOD 30 MIN: CPT | Performed by: INTERNAL MEDICINE

## 2024-01-17 PROCEDURE — 3008F BODY MASS INDEX DOCD: CPT | Performed by: INTERNAL MEDICINE

## 2024-01-17 PROCEDURE — 3078F DIAST BP <80 MM HG: CPT | Performed by: INTERNAL MEDICINE

## 2024-01-17 PROCEDURE — 3074F SYST BP LT 130 MM HG: CPT | Performed by: INTERNAL MEDICINE

## 2024-01-17 RX ORDER — FLUCONAZOLE 150 MG/1
300 TABLET ORAL WEEKLY
Qty: 4 TABLET | Refills: 0 | Status: SHIPPED | OUTPATIENT
Start: 2024-01-17 | End: 2024-01-25

## 2024-01-17 NOTE — PROGRESS NOTES
Subjective:   Hans Simeon is a 48 year old male  who presents for Follow - Up     DM2- due for A1c. States that at home fasting BG has been in 120s-130s    HLD- on statin     Left knee pain- found to have tear of medial meniscus and cartilage.saw ortho. Doing PT.  Had injection.   To follow-up with Dr. Dsouza.     Still has tinea versicolor . Topical did not work.   History/Other:    Chief Complaint Reviewed and Verified  No Further Nursing Notes to   Review  Tobacco Reviewed  Allergies Reviewed  Medications Reviewed    Medical History Reviewed  Surgical History Reviewed  Family History   Reviewed  Social History Reviewed         Current Outpatient Medications   Medication Sig Dispense Refill    fluconazole 150 MG Oral Tab Take 2 tablets (300 mg total) by mouth once a week for 2 doses. 4 tablet 0    atorvastatin 20 MG Oral Tab Take 1 tablet (20 mg total) by mouth nightly. 90 tablet 3    metFORMIN HCl 1000 MG Oral Tab Take 1 tablet (1,000 mg total) by mouth 2 (two) times daily with meals. 180 tablet 3       Review of Systems:  Pertinent items are noted in HPI.  A comprehensive 10 point review of systems was completed.  Pertinent positives and negatives noted in the the HPI.        Objective:   /70 (BP Location: Left arm, Patient Position: Sitting, Cuff Size: adult)   Pulse 80   Temp 98.1 °F (36.7 °C) (Oral)   Resp 16   Ht 5' 8.9\" (1.75 m)   Wt 174 lb 12.8 oz (79.3 kg)   SpO2 98%   BMI 25.89 kg/m²  Estimated body mass index is 25.89 kg/m² as calculated from the following:    Height as of this encounter: 5' 8.9\" (1.75 m).    Weight as of this encounter: 174 lb 12.8 oz (79.3 kg).  PHYSICAL EXAM:   General: no acute distress   Respiratory: no increased work of breathing; good air exchange; CTAB; no crackles or wheezing   Cardiovascular: RRR; S1, S2; no murmurs;  no edema   Gastrointestinal: normal bowel sounds; soft; non-distended; non-tender  Neurological: awake, alert, oriented x3; CNII-XII  grossly intact;  MSK: full ROM; strength 5/5  Behavioral/Psych: euthymic; appropriate affect   Skin: tinea versicolor on back  Bilateral barefoot skin diabetic exam is normal, visualized feet and the appearance is normal.  Bilateral monofilament/sensation of both feet is normal.  Pulsation pedal pulse exam of both lower legs/feet is normal as well.          Assessment & Plan:     Hans was seen today for follow - up.    Diagnoses and all orders for this visit:    Type 2 diabetes mellitus without complication, without long-term current use of insulin (HCC)  -     Vitamin D; Future  -     CBC With Differential With Platelet; Future  -     Comp Metabolic Panel (14); Future  -     Hemoglobin A1C; Future  -     TSH W Reflex To Free T4; Future  -     Lipid Panel; Future  -     Microalb/Creat Ratio, Random Urine [E]; Future  -CPM. Diet/exercise    Hyperlipidemia associated with type 2 diabetes mellitus  (HCC)  -     Vitamin D; Future  -     CBC With Differential With Platelet; Future  -     Comp Metabolic Panel (14); Future  -     Hemoglobin A1C; Future  -     TSH W Reflex To Free T4; Future  -     Lipid Panel; Future  -     Microalb/Creat Ratio, Random Urine [E]; Future  -statin     Routine lab draw  -     Vitamin D; Future  -     CBC With Differential With Platelet; Future  -     Comp Metabolic Panel (14); Future  -     Hemoglobin A1C; Future  -     TSH W Reflex To Free T4; Future  -     Lipid Panel; Future  -     Microalb/Creat Ratio, Random Urine [E]; Future  -     PSA, Total (Screening) [E]; Future    Tear of medial meniscus of left knee, unspecified tear type, unspecified whether old or current tear, initial encounter  -follow-up with ortho and PT as directed     Tinea versicolor  -     Derm Referral - In Network  -     fluconazole 150 MG Oral Tab; Take 2 tablets (300 mg total) by mouth once a week for 2 doses.                Ayah Narayan MD

## 2024-01-22 ENCOUNTER — PATIENT MESSAGE (OUTPATIENT)
Dept: INTERNAL MEDICINE CLINIC | Facility: CLINIC | Age: 49
End: 2024-01-22

## 2024-01-22 ENCOUNTER — LAB ENCOUNTER (OUTPATIENT)
Dept: LAB | Age: 49
End: 2024-01-22
Attending: INTERNAL MEDICINE
Payer: COMMERCIAL

## 2024-01-22 DIAGNOSIS — E78.5 HYPERLIPIDEMIA ASSOCIATED WITH TYPE 2 DIABETES MELLITUS  (HCC): ICD-10-CM

## 2024-01-22 DIAGNOSIS — Z01.89 ROUTINE LAB DRAW: ICD-10-CM

## 2024-01-22 DIAGNOSIS — E11.69 HYPERLIPIDEMIA ASSOCIATED WITH TYPE 2 DIABETES MELLITUS  (HCC): ICD-10-CM

## 2024-01-22 DIAGNOSIS — E11.9 TYPE 2 DIABETES MELLITUS WITHOUT COMPLICATION, WITHOUT LONG-TERM CURRENT USE OF INSULIN (HCC): ICD-10-CM

## 2024-01-22 LAB
ALBUMIN SERPL-MCNC: 4.2 G/DL (ref 3.4–5)
ALBUMIN/GLOB SERPL: 1.4 {RATIO} (ref 1–2)
ALP LIVER SERPL-CCNC: 95 U/L
ANION GAP SERPL CALC-SCNC: 4 MMOL/L (ref 0–18)
AST SERPL-CCNC: 4 U/L (ref 15–37)
BASOPHILS # BLD AUTO: 0.03 X10(3) UL (ref 0–0.2)
BASOPHILS NFR BLD AUTO: 0.5 %
BILIRUB SERPL-MCNC: 0.5 MG/DL (ref 0.1–2)
BUN BLD-MCNC: 13 MG/DL (ref 9–23)
CALCIUM BLD-MCNC: 8.8 MG/DL (ref 8.5–10.1)
CHLORIDE SERPL-SCNC: 106 MMOL/L (ref 98–112)
CHOLEST SERPL-MCNC: 131 MG/DL (ref ?–200)
CO2 SERPL-SCNC: 27 MMOL/L (ref 21–32)
COMPLEXED PSA SERPL-MCNC: 0.45 NG/ML (ref ?–4)
CREAT BLD-MCNC: 1.03 MG/DL
CREAT UR-SCNC: 247 MG/DL
EGFRCR SERPLBLD CKD-EPI 2021: 88 ML/MIN/1.73M2 (ref 60–?)
EOSINOPHIL # BLD AUTO: 0.09 X10(3) UL (ref 0–0.7)
EOSINOPHIL NFR BLD AUTO: 1.6 %
ERYTHROCYTE [DISTWIDTH] IN BLOOD BY AUTOMATED COUNT: 13 %
EST. AVERAGE GLUCOSE BLD GHB EST-MCNC: 177 MG/DL (ref 68–126)
FASTING PATIENT LIPID ANSWER: YES
FASTING STATUS PATIENT QL REPORTED: YES
GLOBULIN PLAS-MCNC: 3.1 G/DL (ref 2.8–4.4)
GLUCOSE BLD-MCNC: 140 MG/DL (ref 70–99)
HBA1C MFR BLD: 7.8 % (ref ?–5.7)
HCT VFR BLD AUTO: 45.8 %
HDLC SERPL-MCNC: 41 MG/DL (ref 40–59)
HGB BLD-MCNC: 15.4 G/DL
IMM GRANULOCYTES # BLD AUTO: 0.03 X10(3) UL (ref 0–1)
IMM GRANULOCYTES NFR BLD: 0.5 %
LDLC SERPL CALC-MCNC: 66 MG/DL (ref ?–100)
LYMPHOCYTES # BLD AUTO: 2.28 X10(3) UL (ref 1–4)
LYMPHOCYTES NFR BLD AUTO: 40.4 %
MCH RBC QN AUTO: 29.8 PG (ref 26–34)
MCHC RBC AUTO-ENTMCNC: 33.6 G/DL (ref 31–37)
MCV RBC AUTO: 88.6 FL
MICROALBUMIN UR-MCNC: 1.37 MG/DL
MICROALBUMIN/CREAT 24H UR-RTO: 5.5 UG/MG (ref ?–30)
MONOCYTES # BLD AUTO: 0.36 X10(3) UL (ref 0.1–1)
MONOCYTES NFR BLD AUTO: 6.4 %
NEUTROPHILS # BLD AUTO: 2.86 X10 (3) UL (ref 1.5–7.7)
NEUTROPHILS # BLD AUTO: 2.86 X10(3) UL (ref 1.5–7.7)
NEUTROPHILS NFR BLD AUTO: 50.6 %
NONHDLC SERPL-MCNC: 90 MG/DL (ref ?–130)
OSMOLALITY SERPL CALC.SUM OF ELEC: 286 MOSM/KG (ref 275–295)
PLATELET # BLD AUTO: 310 10(3)UL (ref 150–450)
POTASSIUM SERPL-SCNC: 4.2 MMOL/L (ref 3.5–5.1)
PROT SERPL-MCNC: 7.3 G/DL (ref 6.4–8.2)
RBC # BLD AUTO: 5.17 X10(6)UL
SODIUM SERPL-SCNC: 137 MMOL/L (ref 136–145)
TRIGL SERPL-MCNC: 139 MG/DL (ref 30–149)
TSI SER-ACNC: 1.99 MIU/ML (ref 0.36–3.74)
VIT D+METAB SERPL-MCNC: 22.5 NG/ML (ref 30–100)
VLDLC SERPL CALC-MCNC: 21 MG/DL (ref 0–30)
WBC # BLD AUTO: 5.7 X10(3) UL (ref 4–11)

## 2024-01-22 PROCEDURE — 3051F HG A1C>EQUAL 7.0%<8.0%: CPT | Performed by: INTERNAL MEDICINE

## 2024-01-22 PROCEDURE — 80053 COMPREHEN METABOLIC PANEL: CPT

## 2024-01-22 PROCEDURE — 82570 ASSAY OF URINE CREATININE: CPT

## 2024-01-22 PROCEDURE — 84443 ASSAY THYROID STIM HORMONE: CPT

## 2024-01-22 PROCEDURE — 82306 VITAMIN D 25 HYDROXY: CPT

## 2024-01-22 PROCEDURE — 83036 HEMOGLOBIN GLYCOSYLATED A1C: CPT

## 2024-01-22 PROCEDURE — 82043 UR ALBUMIN QUANTITATIVE: CPT

## 2024-01-22 PROCEDURE — 3061F NEG MICROALBUMINURIA REV: CPT | Performed by: INTERNAL MEDICINE

## 2024-01-22 PROCEDURE — 85025 COMPLETE CBC W/AUTO DIFF WBC: CPT

## 2024-01-22 PROCEDURE — 80061 LIPID PANEL: CPT

## 2024-01-22 NOTE — TELEPHONE ENCOUNTER
From: Hans Simeon  To: Ayah Narayan  Sent: 1/22/2024 4:38 PM CST  Subject: Question regarding HEMOGLOBIN A1C    Kwabena oliva ac1 salio alto

## 2024-01-29 ENCOUNTER — TELEPHONE (OUTPATIENT)
Dept: INTERNAL MEDICINE CLINIC | Facility: CLINIC | Age: 49
End: 2024-01-29

## 2024-01-29 NOTE — TELEPHONE ENCOUNTER
Patient called to ask if he should be taking both medications    SITagliptin Phosphate 50 MG Oral Tab  metFORMIN HCl 1000 MG Oral Tab    He received SITagliptin Phosphate 50 MG Oral Tab as a new rx and wants to confirm if he should continue metformin or discontinue.    Please call and advise.

## 2024-01-29 NOTE — TELEPHONE ENCOUNTER
Spoke to patient and informed to continue both medication per results note.     Ayah Narayan MD  1/22/2024  6:32 PM CST       Results reviewed. Please inform patient that vitamin D is slightly below goal. Recommend starting OTC vitamin D 2000 units daily and increasing intake of vitamin D in his diet.  A1c is not at goal. Should add januvia to DM regimen; sent in. Should monitor BG and have follow-up within ~ 4 weeks.  PSA wnl.  Cholesterol controlled; continue statin.

## 2024-02-09 ENCOUNTER — OFFICE VISIT (OUTPATIENT)
Dept: ORTHOPEDICS CLINIC | Facility: CLINIC | Age: 49
End: 2024-02-09
Payer: COMMERCIAL

## 2024-02-09 ENCOUNTER — TELEPHONE (OUTPATIENT)
Dept: INTERNAL MEDICINE CLINIC | Facility: CLINIC | Age: 49
End: 2024-02-09

## 2024-02-09 ENCOUNTER — TELEPHONE (OUTPATIENT)
Dept: ORTHOPEDICS CLINIC | Facility: CLINIC | Age: 49
End: 2024-02-09

## 2024-02-09 DIAGNOSIS — M94.262 CHONDROMALACIA OF LEFT KNEE: ICD-10-CM

## 2024-02-09 DIAGNOSIS — S83.232A COMPLEX TEAR OF MEDIAL MENISCUS OF LEFT KNEE AS CURRENT INJURY, INITIAL ENCOUNTER: Primary | ICD-10-CM

## 2024-02-09 DIAGNOSIS — M65.9 SYNOVITIS OF KNEE: ICD-10-CM

## 2024-02-09 PROCEDURE — 99215 OFFICE O/P EST HI 40 MIN: CPT | Performed by: ORTHOPAEDIC SURGERY

## 2024-02-09 NOTE — TELEPHONE ENCOUNTER
Pt informed that Dr Sandoval is out of the office and does not have any availability.     Pt offered appointmetn with RP and accepted.

## 2024-02-09 NOTE — TELEPHONE ENCOUNTER
Appt scheduled with RP.     Your appointments       Date & Time Appointment Department ([place])    Feb 15, 2024  8:30 AM CST Exam - Established with Radha Morin MD [facility] ([facility])

## 2024-02-09 NOTE — TELEPHONE ENCOUNTER
Date of Surgery: [facility] 2024       Post Op Appt:  2024 1030AM    Case ID: [id number]     Notes: Lets please add for  at [facility]. Thanks!             OR BOOKING SHEET KNEE ARTHROSCOPY  [place]                    [x] [facility]  Name: Hans Simeon  MRN: [id number]   : 1975  Diagnosis:  [x] Complex tear of medial meniscus of left knee as current injury, initial encounter [S83.232A]  Disposition:    [x] Ambulatory  Operative Time Required: 45 minutes ([facility])  Procedure:  Antibiotics: 2 g cefazolin within 60 minutes of surgical incision (3 g if > 120 kg)  Laterality:                  [] RIGHT                  [x] LEFT                          [] BILATERAL  Procedures:                    [x] Knee Arthroscopy                                                   [x] Partial Medial Meniscectomy (18663)                    [] Partial Lateral Meniscectomy (27284)                    [] Partial Medial and Lateral Meniscectomy (53648)                       [] Lateral Meniscus Repair (99031)                    [] Medial Meniscus Repair (82437)                       [x] Synovectomy (83738)                    [x] Abrasionplasty (09892)                       [] Partial Medial Meniscectomy vs Medial Meniscus Repair (22161 vs 53185)                    [] Partial Lateral Meniscectomy vs Lateral Meniscus Repair (32709 vs 43776)                    [] Irrigation & Debridement (05116)                    [] Manipulation Under Anesthesia (62774)                    [] Chondroplasty (07780)                    [] Removal of Loose Body (78800)     Injections:                    [] Stem cells from bone marrow aspiration (24146)                                      From:                   [] Left Iliac crest                   [] Right Iliac crest                                      To:                   [] Left knee         [] Right knee                          [] Other      Additional info:   [x] PCP Clearance Needed  []  MRSA  [x] Physical Therapy External - Conner Mane  [] DME Rx  [] Appt with Dr. Dsouza needed  Implants needed: nONE  Positioning Equipment: Supine with Lateral Post

## 2024-02-09 NOTE — H&P
Orthopaedic Surgery  [address] [place] [address]  [phone]     PRE SURGICAL - HISTORY AND PHYSICAL EXAMINATION     Name: Hans Simeon   MRN: [id number]  Date: 2/9/2024     CC: Left Knee Pain and mechanical symptoms    REFERRED BY: Ayah Narayan MD    HPI:   Hans Simeon is a very pleasant 48 year old male who presents today for evaluation of Left knee pain and mechanical symptoms and recent completion of MRI demonstrating meniscal pathology.     To summarize: The patient is well-known to our team for evaluation of the left knee in June after which she was provided with a corticosteroid and ketorolac injection.  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  In the interval he has completed extensive physical therapy greater than 3 months without symptomatic improvement or resolution.  He presents for repeat clinical assessment and discussion regarding definitive management plan    Today, patient was in office for pain in knee. Pain levels today are 6/10 with stiffness and pain. He mentions he has good and bad days and was doing home exercises which was not helping. He is worried about his ongoing diabetes and recent A1c of 7.8 and will need to see his PCP before any surgical treatment.    PMH:   Past Medical History:   Diagnosis Date    Diabetes (HCC)     Diabetes mellitus (HCC)     Essential hypertension     Hyperlipidemia        PAST SURGICAL HX:  Past Surgical History:   Procedure Laterality Date    COLONOSCOPY      HERNIA SURGERY  1992       FAMILY HX:  Family History   Problem Relation Age of Onset    Diabetes Mother     Cancer Mother         uterine cancer    Cancer Sister         breast cancer    Cancer Paternal Grandmother         ? cancer       ALLERGIES:  Patient has no known allergies.    MEDICATIONS:   Current Outpatient Medications   Medication Sig Dispense Refill    SITagliptin Phosphate 50 MG Oral Tab Take 1 tablet (50 mg total) by mouth  daily. 90 tablet 0    atorvastatin 20 MG Oral Tab Take 1 tablet (20 mg total) by mouth nightly. 90 tablet 3    metFORMIN HCl 1000 MG Oral Tab Take 1 tablet (1,000 mg total) by mouth 2 (two) times daily with meals. 180 tablet 3       ROS: A comprehensive 14 point review of systems was performed and was negative aside from the aforementioned per history of present illness.    SOCIAL HX:  Social History     Tobacco Use    Smoking status: Former     Packs/day: 0.75     Years: 16.00     Additional pack years: 0.00     Total pack years: 12.00     Types: Cigarettes     Start date:      Quit date: 2022     Years since quittin.9    Smokeless tobacco: Never   Substance Use Topics    Alcohol use: Yes     Comment: Occasional        PE:   There were no vitals filed for this visit.  Estimated body mass index is 25.89 kg/m² as calculated from the following:    Height as of 24: 5' 8.9\" (1.75 m).    Weight as of 24: 174 lb 12.8 oz.    Physical Exam  Constitutional:       Appearance: Normal appearance.   HENT:      Head: Normocephalic and atraumatic.   Eyes:      Extraocular Movements: Extraocular movements intact.   Neck:      Musculoskeletal: Normal range of motion and neck supple.   Cardiovascular:      Pulses: Normal pulses.   Pulmonary:      Effort: Pulmonary effort is normal. No respiratory distress.   Abdominal:      General: There is no distension.   Skin:     General: Skin is warm.      Capillary Refill: Capillary refill takes less than 2 seconds.      Findings: No bruising.   Neurological:      General: No focal deficit present.      Mental Status: Alert.   Psychiatric:         Mood and Affect: Mood normal.     Examination of the left knee demonstrates:     Skin is intact, warm and dry.   Atrophy: none    Effusion: small    Joint line tenderness: none  Crepitation: none   Dane: Positive   Patellar mobility: normal without apprehension  J-sign: none    ROM: Extension lacking less than 5  degrees  Flexion 120 degrees  ACL:  Negative Lachman, Negative Pivot Shift   PCL:  Negative Posterior Drawer  Collateral Ligaments: Stable to Varus and Valgus stress at 0 and 30 degrees  Strength: mild weakness   Hip joint: normal pain-free ROM   Gait:  mildly antalgic   Leg length: equal and symmetric  Alignment:  neutral     No obvious peripheral edema noted.   Distal neurovascular exam demonstrates normal perfusion, intact sensation to light touch and full strength.     Examination of the contralateral knee demonstrates:  No significant atrophy, swelling or effusion. Full range of motion. Neurovascularly intact distally.    Radiographic Examination/Diagnostics:  MRI of the knee personally viewed, independently interpreted and radiology report was reviewed.    MRI KNEE, LEFT (KYR=18174)     Result Date: 6/27/2023  PROCEDURE:  MRI KNEE, LEFT (CPT=73721)  COMPARISON:  [facility], [facility] KNEE (3 VIEWS), LEFT (CPT=73562), 6/06/2023, 6:11 PM.  INDICATIONS:  R93.6 Abnormal x-ray of knee M25.562 Left knee pain, unspecified chronicity  TECHNIQUE:  Axial, coronal, and sagittal proton density with and without fat saturation images were obtained.  PATIENT STATED HISTORY: (As transcribed by Technologist)  patient states anterior and posterior knee pain lasting 1 month, no accident or trauma.    FINDINGS:  LIGAMENTS:          The ACL, PCL, patellar retinacula, and collateral ligament complexes are intact. MENISCI:            There is complex oblique horizontal and radial tear posterior horn and body of the medial meniscus.  There is moderate subluxation of the anterior horn and body of the medial meniscus.  The lateral meniscus is intact. TENDONS:            The tendinous insertions about the knee are intact without significant tendinosis or tears. MUSCULATURE:        No evidence of strain, edema, or atrophy. BONY COMPARTMENTS:  There is high-grade loss of articular cartilage in the medial compartment of the knee joint with  mixture of full-thickness and delamination type defect on the medial femoral condyle measuring transverse diameter of 1 cm and anterior to posterior diameter of 1.3 cm.  There is subchondral reactive edema within the medial femoral condyle.  There is low signal intensity focus noted within the intercondylar notch noted adjacent to the femoral attachment of ACL axial images series 3, image 17 which could represent a small intra-articular ossific body. SYNOVIUM:           There is a mild joint effusion.              CONCLUSION:  1. Mixed radial and horizontal oblique tear posterior horn and body medial meniscus. 2. Full-thickness cartilage defect medial femoral condyle with subchondral reactive edema. 3. Small low signal intensity focus within joint recess in the intercondylar notch could be a small intra-articular ossific body. 4. Cruciate and collateral ligaments are intact. 5. There is a moderate joint effusion.   LOCATION:  [facility]          Dictated by (CST): Osman Welch MD on 6/27/2023 at 5:11 PM     Finalized by (CST): Osman Welch MD on 6/27/2023 at 5:15 PM          IMPRESSION: Hans Simeon is a 48 year old male with complex peripheral medial Meniscus Tear with evidence of full-thickness articular cartilage wear over the medial femoral condyle.  They have failed extensive conservative treatment including Physical therapy greater than 6 weeks, intra-articular knee corticosteroid injection, oral anti-inflammatory medications, and activity modification.     Consequently, they are an appropriate candidate for knee arthroscopy, partial meniscectomy, abrasion plasty and extensive debridement/synovectomy.    PLAN:   I had a lengthy discussion with Hans about the diagnosis and options, both surgical and nonsurgical. I have recommended that we proceed with arthroscopic surgical treatment as we agree that this intervention will likely offer the best opportunity for symptomatic relief and functional recovery. I  used the MRI scan and a 3-dimensional knee model to outline his pathology, as well as general surgical principles. We reviewed the risks associated with arthroscopic partial meniscectomy and debridement / synovectomy.  In particular I emphasized that the displaced flap component and degenerative portion of the meniscus would be removed as this is dysvascular.  Simultaneously, I will perform a diagnostic knee arthroscopy of the knee and hope to identify and address any other pathology that may be encountered such as scar tissue, inflammation, cartilage pathology.  In particular we discussed risks that include, a low risk of infection (<1%), potential transient or permanent injury to nerves with superficial numbness, joint stiffness which may require additional physical therapy, persistent pain/lack of symptomatic improvement, need for future operation, wound complications (rare as incisions are very small), deep vein thrombosis (DVT) and pulmonary embolism (PE).   We discussed the proposed rehabilitation timeline as well as expected postoperative restrictions.  In this regard, I emphasized that there will be no weightbearing or range of motion restrictions post operatively.  Crutches will be provided initially for patient comfort and I will aim to have the patient begin physical therapy on postoperative day 1.  The advantage of this is to begin immediate mobility and range of motion to mitigate pain and encourage reduction of inflammation/swelling.  This will ultimately lead to a quicker postsurgical rehabilitation.  For most patients, this requires a total of 4 to 6 weeks of postsurgical physical therapy to attain full functional status after simple knee arthroscopy.  Hans voiced a good understanding of treatment options, risks and benefits, postoperative instructions, rehabilitation timeline, and restrictions. He was given the opportunity to ask questions, which were all answered to the best of my ability and to  his satisfaction. Hans will work with my office to arrange a time for surgery and obtain any medical clearance information necessary. My pre-operative information packet, which details the process and answers many FAQ's will be provided. He was encouraged to call the office with any further questions or concerns.  I spent 45 minutes in preparation to see the patient, counseling/education of relevant pathology, discussing imaging results, ordering post surgical physical therapy intervention, surgical counseling, and care coordination.        We discussed all possible treatment options. Patient was scheduled for surgery 2 weeks from today. Advised to continue physical therapy.     FOLLOW-UP:  Post-Operative Visit, POD 6 with Sincer ABBY London PA-C.         Kyung Dsouza MD  Knee, Shoulder, & Elbow Surgery / Sports Medicine Specialist  Orthopaedic Surgery  [address] [place] [address].org  [email or web address]  t: [phone]  o: [phone]  f: [phone]    This note was dictated using Dragon software.  While it was briefly proofread prior to completion, some grammatical, spelling, and word choice errors due to dictation may still occur.

## 2024-02-09 NOTE — PROGRESS NOTES
OR BOOKING SHEET KNEE ARTHROSCOPY  Location: [] [facility]   [x] [facility]  Name: Hans Simeon  MRN: [id number]   : 1975  Diagnosis:  [x] Complex tear of medial meniscus of left knee as current injury, initial encounter [S83.232A]  Disposition:    [x] Ambulatory  Operative Time Required: 45 minutes ([facility])  Procedure:  Antibiotics: 2 g cefazolin within 60 minutes of surgical incision (3 g if > 120 kg)  Laterality: [] RIGHT  [x] LEFT                       [] BILATERAL  Procedures:   [x] Knee Arthroscopy     [x] Partial Medial Meniscectomy (42069)   [] Partial Lateral Meniscectomy (64146)                    [] Partial Medial and Lateral Meniscectomy (76127)     [] Lateral Meniscus Repair (44122)                    [] Medial Meniscus Repair (56566)     [x] Synovectomy (64283)   [x] Abrasionplasty (22757)     [] Partial Medial Meniscectomy vs Medial Meniscus Repair (61849 vs 56429)   [] Partial Lateral Meniscectomy vs Lateral Meniscus Repair (04337 vs 75454)   [] Irrigation & Debridement (88666)   [] Manipulation Under Anesthesia (84795)   [] Chondroplasty (98579)   [] Removal of Loose Body (56442)    Injections:   [] Stem cells from bone marrow aspiration (39543)    From:  [] Left Iliac crest  [] Right Iliac crest    To:  [] Left knee  [] Right knee  [] Other     Additional info:   [x] PCP Clearance Needed  [] MRSA  [x] Physical Therapy External - Conner Mane  [] DME Rx  [] Appt with Dr. Dsouza needed  Implants needed: nONE  Positioning Equipment: Supine with Lateral Post

## 2024-02-15 ENCOUNTER — OFFICE VISIT (OUTPATIENT)
Dept: INTERNAL MEDICINE CLINIC | Facility: CLINIC | Age: 49
End: 2024-02-15
Payer: COMMERCIAL

## 2024-02-15 VITALS
WEIGHT: 169.63 LBS | RESPIRATION RATE: 12 BRPM | BODY MASS INDEX: 25.12 KG/M2 | HEART RATE: 58 BPM | TEMPERATURE: 98 F | DIASTOLIC BLOOD PRESSURE: 72 MMHG | OXYGEN SATURATION: 99 % | HEIGHT: 68.75 IN | SYSTOLIC BLOOD PRESSURE: 114 MMHG

## 2024-02-15 DIAGNOSIS — E78.5 HYPERLIPIDEMIA ASSOCIATED WITH TYPE 2 DIABETES MELLITUS  (HCC): ICD-10-CM

## 2024-02-15 DIAGNOSIS — E11.9 TYPE 2 DIABETES MELLITUS WITHOUT COMPLICATION, WITHOUT LONG-TERM CURRENT USE OF INSULIN (HCC): ICD-10-CM

## 2024-02-15 DIAGNOSIS — Z87.19 HISTORY OF ULCERATIVE COLITIS: ICD-10-CM

## 2024-02-15 DIAGNOSIS — Z01.818 PREOPERATIVE EXAMINATION: Primary | ICD-10-CM

## 2024-02-15 DIAGNOSIS — E11.69 HYPERLIPIDEMIA ASSOCIATED WITH TYPE 2 DIABETES MELLITUS  (HCC): ICD-10-CM

## 2024-02-15 DIAGNOSIS — S83.232D COMPLEX TEAR OF MEDIAL MENISCUS OF LEFT KNEE AS CURRENT INJURY, SUBSEQUENT ENCOUNTER: ICD-10-CM

## 2024-02-15 PROCEDURE — 3078F DIAST BP <80 MM HG: CPT | Performed by: INTERNAL MEDICINE

## 2024-02-15 PROCEDURE — 3074F SYST BP LT 130 MM HG: CPT | Performed by: INTERNAL MEDICINE

## 2024-02-15 PROCEDURE — 3008F BODY MASS INDEX DOCD: CPT | Performed by: INTERNAL MEDICINE

## 2024-02-15 PROCEDURE — 99214 OFFICE O/P EST MOD 30 MIN: CPT | Performed by: INTERNAL MEDICINE

## 2024-02-15 NOTE — PATIENT INSTRUCTIONS
- Ok for surgery  - Hold sitagliptin and morning metformin dose on morning of surgery  - Hold all vitamins/supplements/over the counter medications until after surgery  - Follow up with Dr. Sandoval in 2-3 months for diabetes follow up.    It was a pleasure seeing you in the clinic today.  Thank you for choosing the [facility] office for your healthcare needs. Please call at [phone] with any questions or concerns.    Radha Morin MD

## 2024-02-21 DIAGNOSIS — Z98.890 S/P ARTHROSCOPY OF KNEE: Primary | ICD-10-CM

## 2024-02-21 RX ORDER — ONDANSETRON 4 MG/1
TABLET, FILM COATED ORAL
Qty: 8 TABLET | Refills: 0 | Status: SHIPPED | OUTPATIENT
Start: 2024-02-21

## 2024-02-21 RX ORDER — TRAMADOL HYDROCHLORIDE 50 MG/1
TABLET ORAL
Qty: 20 TABLET | Refills: 1 | Status: SHIPPED | OUTPATIENT
Start: 2024-02-21

## 2024-02-21 RX ORDER — MELOXICAM 15 MG/1
15 TABLET ORAL DAILY
Qty: 14 TABLET | Refills: 1 | Status: SHIPPED | OUTPATIENT
Start: 2024-02-21

## 2024-02-26 ENCOUNTER — TELEPHONE (OUTPATIENT)
Dept: ORTHOPEDICS CLINIC | Facility: CLINIC | Age: 49
End: 2024-02-26

## 2024-02-26 NOTE — TELEPHONE ENCOUNTER
Called [facility] and gave them the link to Dr. Alonso website with all the PT protocols.  They took they website and read it back.     [facility] called requesting for the PT protocol for after surgery. It can be faxed to [phone]

## 2024-02-26 NOTE — TELEPHONE ENCOUNTER
[facility] called requesting for the PT protocol for after surgery. It can be faxed to [phone]

## 2024-02-28 ENCOUNTER — OFFICE VISIT (OUTPATIENT)
Dept: ORTHOPEDICS CLINIC | Facility: CLINIC | Age: 49
End: 2024-02-28
Payer: COMMERCIAL

## 2024-02-28 DIAGNOSIS — Z98.890 S/P ARTHROSCOPY OF KNEE: Primary | ICD-10-CM

## 2024-02-28 PROCEDURE — 99024 POSTOP FOLLOW-UP VISIT: CPT | Performed by: PHYSICIAN ASSISTANT

## 2024-02-28 NOTE — PROGRESS NOTES
[facility] ORTHOPEDICS  [address] [place] [address]  [phone]       Name: Hans Simeon   MRN: [id number]  Date: 2/28/2024     REASON FOR VISIT: First Post-Surgical Visit   Surgery: Left knee scope, medial meniscectomy, extensive debridement on 02/22/2024.     INTERVAL HISTORY:  Hans Simeon is a 48 year old male who returns after the aforementioned procedure.  The post-operative course has been unremarkable with pain well controlled and overall progress noted.     Physical therapy was started and is progressing well.  The patient denies any calf pain or tenderness, fevers, chills, sweats or signs of active infection. The patient has been compliant with the postoperative protocol, and admits to normal bowel and bladder function. No acute issues.     ROS: ROS    PE:   There were no vitals filed for this visit.  Estimated body mass index is 25.23 kg/m² as calculated from the following:    Height as of 2/15/24: 5' 8.75\" (1.746 m).    Weight as of 2/15/24: 169 lb 9.6 oz (76.9 kg).    Physical Exam  Constitutional:       Appearance: Normal appearance.   HENT:      Head: Normocephalic and atraumatic.   Eyes:      Extraocular Movements: Extraocular movements intact.   Neck:      Musculoskeletal: Normal range of motion and neck supple.   Cardiovascular:      Pulses: Normal pulses.   Pulmonary:      Effort: Pulmonary effort is normal. No respiratory distress.   Abdominal:      General: There is no distension.   Skin:     General: Skin is warm.      Capillary Refill: Capillary refill takes less than 2 seconds.      Findings: No bruising.   Neurological:      General: No focal deficit present.      Mental Status: She is alert.   Psychiatric:         Mood and Affect: Mood normal.     Examination of the left  knee demonstrates:     Physical examination the patient is alert and oriented x3, well-developed, well-nourished, no acute distress.     Tegaderm dressings were removed, and  Steri-Strips were maintained and kept in place.    Incisional sites are clean dry intact without signs of active pathology.  Calf is soft and nontender to palpation.    The contralateral knee is without limitation in range of motion or strength, no positive provocative maneuvers.       IMPRESSION: Hans Simeon is a 48 year old male who presents 6 days s/p Left knee scope, medial meniscectomy, extensive debridement on 02/22/2024.     PLAN:   We had a lengthy discussion with the patient regarding the patient's findings consistent with the expected postoperative course. We recommend continuation of physical therapy with rehabilitation efforts focused on strengthening, range of motion, functional ability, and return to baseline activity. The patient can continue to progress per protocol.    All questions were answered appropriately and the patient was in agreement with the treatment plan.       FOLLOW-UP:  Return to clinic in four weeks. No imaging required at next visit.             Enmanuel London [facility], PA-C Orthopedic Surgery / Sports Medicine Specialist  [facility] Orthopaedic Surgery  [address] [place] [address].org  [email or web address]  t: [phone]  o: [phone]  f: [phone]    This note was dictated using Dragon software.  While it was briefly proofread prior to completion, some grammatical, spelling, and word choice errors due to dictation may still occur.

## 2024-03-14 ENCOUNTER — HOSPITAL ENCOUNTER (OUTPATIENT)
Age: 49
Discharge: HOME | End: 2024-03-14
Payer: COMMERCIAL

## 2024-03-14 VITALS
WEIGHT: 169 LBS | HEIGHT: 69 IN | SYSTOLIC BLOOD PRESSURE: 124 MMHG | RESPIRATION RATE: 16 BRPM | BODY MASS INDEX: 25.03 KG/M2 | TEMPERATURE: 98 F | DIASTOLIC BLOOD PRESSURE: 91 MMHG | OXYGEN SATURATION: 98 % | HEART RATE: 88 BPM

## 2024-03-14 DIAGNOSIS — L03.213 PRESEPTAL CELLULITIS OF LEFT UPPER EYELID: Primary | ICD-10-CM

## 2024-03-14 PROCEDURE — 99213 OFFICE O/P EST LOW 20 MIN: CPT

## 2024-03-14 PROCEDURE — 99214 OFFICE O/P EST MOD 30 MIN: CPT

## 2024-03-14 RX ORDER — ERYTHROMYCIN 5 MG/G
1 OINTMENT OPHTHALMIC EVERY 6 HOURS
Qty: 1 EACH | Refills: 0 | Status: SHIPPED | OUTPATIENT
Start: 2024-03-14 | End: 2024-03-21

## 2024-03-14 RX ORDER — AMOXICILLIN AND CLAVULANATE POTASSIUM 875; 125 MG/1; MG/1
1 TABLET, FILM COATED ORAL 2 TIMES DAILY
Qty: 20 TABLET | Refills: 0 | Status: SHIPPED | OUTPATIENT
Start: 2024-03-14 | End: 2024-03-24

## 2024-03-14 NOTE — ED INITIAL ASSESSMENT (HPI)
Patient states 3 days ago he woke up with pain to the left upper eyelid. States the pain has gotten worse and the eyelid is swollen and red. Denies any injury or trauma, discharge/drainage, no contact use, and no other symptoms. States he has not used anything to the eyes.

## 2024-03-14 NOTE — DISCHARGE INSTRUCTIONS
Use oral antibiotic and topical antibiotic as directed.  Follow-up with ophthalmology.  Warm soaks 4-5 times daily for 10 to 15 minutes.  If any fever, increased redness, or pain with eye movement go to the emergency room.

## 2024-03-14 NOTE — ED PROVIDER NOTES
Patient Seen in: [facility]      History     Chief Complaint   Patient presents with    Eye Visual Problem     Stated Complaint: left eye irritation    Subjective:   HPI  48-year-old diabetic with hypertension and hyperlipidemia presents with left upper eyelid redness and swelling.  He denies any fever or chills.  He denies any cough or congestion.  He denies any pain with extraocular movements.  He denies any injury.  He states that there is a swollen bump to the mid left upper eyelid.  He denies any injury.  He denies any visual disturbances.  He denies any eye drainage.    Objective:   Past Medical History:   Diagnosis Date    Diabetes (HCC)     Diabetes mellitus (HCC)     Essential hypertension     High cholesterol     Hyperlipidemia               Past Surgical History:   Procedure Laterality Date    COLONOSCOPY      HERNIA SURGERY      KNEE SURGERY Left 2024                Social History     Socioeconomic History    Marital status:    Tobacco Use    Smoking status: Former     Packs/day: 0.75     Years: 16.00     Additional pack years: 0.00     Total pack years: 12.00     Types: Cigarettes     Start date:      Quit date: 2022     Years since quittin.0    Smokeless tobacco: Never   Vaping Use    Vaping Use: Never used   Substance and Sexual Activity    Alcohol use: Yes     Comment: Occasional     Drug use: No   Other Topics Concern    Caffeine Concern Yes     Comment: 1 cup of coffee daily    Exercise Yes     Comment: sometimes    Seat Belt No    Special Diet No    Stress Concern Yes              Review of Systems   All other systems reviewed and are negative.      Positive for stated complaint: left eye irritation  Other systems are as noted in HPI.  Constitutional and vital signs reviewed.      All other systems reviewed and negative except as noted above.    Physical Exam     ED Triage Vitals [24 1213]   BP (!) 124/91   Pulse 88   Resp 16   Temp 97.5 °F (36.4  °C)   Temp src Temporal   SpO2 98 %   O2 Device None (Room air)       Current:BP (!) 124/91   Pulse 88   Temp 97.5 °F (36.4 °C) (Temporal)   Resp 16   Ht 175.3 cm (5' 9\")   Wt 76.7 kg   SpO2 98%   BMI 24.96 kg/m²     Right Eye Chart Acuity: 20/30, Uncorrected  Left Eye Chart Acuity: 20/40, Uncorrected    Physical Exam  Vitals and nursing note reviewed.   Constitutional:       Appearance: He is well-developed.   Eyes:      Conjunctiva/sclera: Conjunctivae normal.      Comments: Redness and swelling to the left upper eyelid.  Palpable 0.5 bump to the center of the left eyelid not on the edge of the eyelid.  No drainage.  No proptosis.  No pain with extraocular movements.   Cardiovascular:      Rate and Rhythm: Normal rate and regular rhythm.      Heart sounds: Normal heart sounds.   Pulmonary:      Effort: Pulmonary effort is normal.      Breath sounds: Normal breath sounds.   Skin:     General: Skin is warm and dry.   Neurological:      Mental Status: He is alert and oriented to person, place, and time.               ED Course   Labs Reviewed - No data to display                   MDM     Medical Decision Making  48-year-old diabetic with hypertension and hyperlipidemia presents with left upper eyelid redness and swelling.  He denies any fever or chills.  He denies any cough or congestion.  He denies any pain with extraocular movements.  He denies any injury.  He states that there is a swollen bump to the mid left upper eyelid.  He denies any injury.  He denies any visual disturbances.  He denies any eye drainage.    Clinical impression: Preseptal cellulitis    Differential diagnosis:Preseptal cellulitis, orbital cellulitis, stye    There is a bump to the center of the upper eyelid not directly on the edge of the eyelid that is not red or tender.  There is significant redness and swelling with tenderness to the lower eyelid without drainage.  Patient will be started on erythromycin as well as Augmentin.  I  discussed with him close follow-up with ophthalmology.  He denies any visual disturbances.  There is no proptosis or pain with extraocular movements.  Patient is afebrile and nontoxic.    Problems Addressed:  Preseptal cellulitis of left upper eyelid: acute illness or injury        Disposition and Plan     Clinical Impression:  1. Preseptal cellulitis of left upper eyelid         Disposition:  Discharge  3/14/2024 12:43 pm    Follow-up:  Eliceo Bronson MD  [address] [address]  [phone]    Call             Medications Prescribed:  Discharge Medication List as of 3/14/2024 12:54 PM        START taking these medications    Details   amoxicillin clavulanate 875-125 MG Oral Tab Take 1 tablet by mouth 2 (two) times daily for 10 days., Normal, Disp-20 tablet, R-0      erythromycin 5 MG/GM Ophthalmic Ointment Place 1 Application into the left eye every 6 (six) hours for 7 days., Normal, Disp-1 each, R-0

## 2024-03-29 ENCOUNTER — OFFICE VISIT (OUTPATIENT)
Dept: ORTHOPEDICS CLINIC | Facility: CLINIC | Age: 49
End: 2024-03-29
Payer: COMMERCIAL

## 2024-03-29 DIAGNOSIS — Z98.890 S/P ARTHROSCOPY OF KNEE: Primary | ICD-10-CM

## 2024-03-29 PROCEDURE — 99024 POSTOP FOLLOW-UP VISIT: CPT | Performed by: PHYSICIAN ASSISTANT

## 2024-03-29 NOTE — PROGRESS NOTES
[facility] ORTHOPEDICS  [address] [place] [id number]  [phone]       Name: Hans Simeon   MRN: [id number]  Date: 3/29/2024     REASON FOR VISIT: Second Post-Surgical Visit   Surgery:  Left knee scope, medial meniscectomy, extensive debridement on 02/22/2024.     INTERVAL HISTORY:  Hans Simeon is a 48 year old male who returns after the aforementioned procedure.  The post-operative course has been unremarkable with pain well controlled and overall progress noted.     Physical therapy was started and is progressing well.  No acute issues.     ROS: ROS    PE:   There were no vitals filed for this visit.  Estimated body mass index is 24.96 kg/m² as calculated from the following:    Height as of 3/14/24: 5' 9\" (1.753 m).    Weight as of 3/14/24: 169 lb (76.7 kg).    Physical Exam  Constitutional:       Appearance: Normal appearance.   HENT:      Head: Normocephalic and atraumatic.   Eyes:      Extraocular Movements: Extraocular movements intact.   Neck:      Musculoskeletal: Normal range of motion and neck supple.   Cardiovascular:      Pulses: Normal pulses.   Pulmonary:      Effort: Pulmonary effort is normal. No respiratory distress.   Abdominal:      General: There is no distension.   Skin:     General: Skin is warm.      Capillary Refill: Capillary refill takes less than 2 seconds.      Findings: No bruising.   Neurological:      General: No focal deficit present.      Mental Status: She is alert.   Psychiatric:         Mood and Affect: Mood normal.     Examination of the left knee demonstrates:     Skin is intact, warm and dry.   Atrophy: none    Effusion: none    Joint line tenderness: none  Crepitation: none   Dane: Negative   Patellar mobility: normal without apprehension  J-sign: none    ROM: Extension full  Flexion 140 degrees  ACL:  Negative Lachman, Negative Pivot Shift   PCL:  Negative Posterior Drawer  Collateral Ligaments: Stable to Varus and Valgus  stress at 0 and 30 degrees  Strength: normal   Hip joint: normal pain-free ROM   Gait:  normal   Leg length: equal and symmetric  Alignment:  neutral     No obvious peripheral edema noted.  Incisional sites clean, dry and intact with no signs of active pathology.   Distal neurovascular exam demonstrates normal perfusion, intact sensation to light touch and full strength.       IMPRESSION: Hans Simeon is a 48 year old male who presents 5.5 weeks s/p  Left knee scope, medial meniscectomy, extensive debridement on 02/22/2024.     PLAN:   We had a lengthy discussion with the patient regarding the patient's findings consistent with the expected postoperative course. We recommend continuation of physical therapy with rehabilitation efforts focused on strengthening, range of motion, functional ability, and return to baseline activity. The patient can continue to progress per protocol.    All questions were answered appropriately and the patient was in agreement with the treatment plan.       FOLLOW-UP:  Return to clinic on an as needed basis.             Enmanuel London [facility], PA-C Orthopedic Surgery / Sports Medicine Specialist  [facility] Orthopaedic Surgery  [address] [place] [address].org  [email or web address]  t: [phone]  o: [phone]  f: [phone]    This note was dictated using Dragon software.  While it was briefly proofread prior to completion, some grammatical, spelling, and word choice errors due to dictation may still occur.

## 2024-03-30 DIAGNOSIS — E11.65 TYPE 2 DIABETES MELLITUS WITH HYPERGLYCEMIA, UNSPECIFIED WHETHER LONG TERM INSULIN USE (HCC): ICD-10-CM

## 2024-04-01 NOTE — TELEPHONE ENCOUNTER
Requesting    Name from pharmacy: Januvia 50 MG Oral Tablet         Will file in chart as: JANUVIA 50 MG Oral Tab    Sig: TOME 1 TABLETA POR LA BOCA CADA  ALIREZA    Disp: 90 tablet    Refills: 3    Start: 3/30/2024    Class: Normal    For: Type 2 diabetes mellitus with hyperglycemia, unspecified whether long term insulin use (HCC)    To pharmacy: Please send a replace/new response with 90-Day Supply if appropriate to maximize member benefit. Requesting 1 year supply.    Last ordered: 2 months ago (1/22/2024) by Ayah Narayan MD    Last refill: 1/23/2024    Rx #: [id number]    Diabetes Medication Protocol Oravji6503/30/2024 04:34 AM   Protocol Details Last A1C < 7.5 and within past 6 months    In person appointment or virtual visit in the past 6 mos or appointment in next 3 mos    Microalbumin procedure in past 12 months or taking ACE/ARB    EGFRCR or GFRNAA > 50    GFR in the past 12 months      To be filled at: [address] [phone], [phone]        LOV: 02/15/24 w/ RP   RTC: 04/15/24  Last Relevant Labs: 01/22/24   Filled: 01/22/24 #90 with 0 refills    No future appointments.

## 2024-04-01 NOTE — TELEPHONE ENCOUNTER
[facility] Medicine  Progress Note    Patient Name: Christine Mosqueda  MRN: [id number]  Patient Class: IP- Inpatient   Admission Date: 2/15/2024  Length of Stay: 3 days  Attending Physician: Yasmany Cary MD  Primary Care Provider: [facility]        Subjective:     Principal Problem:Estephanie's gangrene        HPI:  Ms. Mosqueda is a 53 yoF with a PMHx of diastolic heart failure, HTN, T2DM on insulin. She presented to the hospital with worsening pain and swelling of the right medial thigh. She developed an abscess in the area that underwent I+D at [facility] a few days ago. She was sent home on PO abx. Since that time the pain and swelling have worsened.  In ED, CT scan demonstrated extensive inflammation extending from the right perineum to the right medial thigh with multiple foci of air. She was admitted to general surgery for estephanie's gangrene. She underwent extensive debridement under general surgery today in OR. She is currently doing well with good pain control.  consulted for medical management.     Overview/Hospital Course:  A 53 y.o F w/ PMHx of poorly controlled type II DM, who was admitted w/ worsening pain and swelling of the Rt medial thigh. CT scan findings were consistent w/ a possible Estephanie's gangrene, pt had surgical debridement done on 02/16 and again she had wound washout and necrotic tissue removal on 02/17, currently she is being covered w/ IV Vanc + Meropenem + Clindamycin, admitted to the MICU postoperatively for close monitoring. Medicine service will be primary on this pt. Plan for repeat washout 2/19.    Subjective:   Interval History:  No new issues, Hb dropped this AM and being transfused. Plan for OR today      Review of Systems  Objective:     Vital Signs (Most Recent):  Temp: 97.9 °F (36.6 °C) (02/19/24 1115)  Pulse: 78 (02/19/24 1300)  Resp: (!) 25 (02/19/24 1300)  BP: (!) 153/93 (02/19/24 1300)  SpO2: 97 % (02/19/24 1300) Vital Signs  Please remind pt to follow-up    "(24h Range):  Temp:  [97.6 °F (36.4 °C)-98.7 °F (37.1 °C)] 97.9 °F (36.6 °C)  Pulse:  [] 78  Resp:  [18-39] 25  SpO2:  [92 %-100 %] 97 %  BP: (115-159)/(55-93) 153/93     Weight: 93 kg (205 lb)  Body mass index is 35.19 kg/m².     Physical Exam  Vitals and nursing note reviewed.   Constitutional:       General: She is not in acute distress.     Appearance: She is ill-appearing.   HENT:      Head: Normocephalic.      Mouth/Throat:      Mouth: Mucous membranes are dry.   Eyes:      Conjunctiva/sclera: Conjunctivae normal.   Cardiovascular:      Rate and Rhythm: Normal rate and regular rhythm.      Pulses: Normal pulses.   Pulmonary:      Effort: Pulmonary effort is normal. No respiratory distress.   Abdominal:      General: Bowel sounds are normal.   Musculoskeletal:      Comments: Thigh dressing intact   Skin:     General: Skin is warm.   Neurological:      Mental Status: She is alert and oriented to person, place, and time. Mental status is at baseline.   Psychiatric:         Mood and Affect: Mood normal.         Thought Content: Thought content normal.          Significant Labs: All pertinent labs within the past 24 hours have been reviewed.  Blood Culture:   Recent Labs   Lab 02/17/24 2005 02/17/24 2006   LABBLOO No Growth to date  No Growth to date No Growth to date  No Growth to date       BMP:   Recent Labs   Lab 02/19/24  0341   *   *   K 4.6      CO2 25   BUN 34*   CREATININE 1.6*   CALCIUM 7.6*   MG 1.9       CBC:   Recent Labs   Lab 02/18/24  0323 02/19/24  0341 02/19/24  1246   WBC 31.73* 24.07*  --    HGB 7.0* 6.8* 12.5   HCT 22.2* 21.3*  --    * 499*  --        Urine Culture: No results for input(s): "LABURIN" in the last 48 hours.    Significant Imaging: I have reviewed all pertinent imaging results/findings within the past 24 hours.    Assessment/Plan:      * Librado's gangrene  S/p initial debridement on 02/16 and again on 02/17/2024  Op note reviewed:  Cont Abx " coverage w/ IV Vanc + Meropenem + Clindamycin  Blood culture w GPR on 2/15, surgical wound cultures with GPR and lactobacillus species  - repeat cultures neg on 2/17  ID consult requested, appreciate recs  Plan to return to OR for an additional wound exploration/debridement/washout on Monday 2/19/24.       ETOH abuse  Patient drinks at least a 6 pack beer daily  Start scheduled librium. Taper.   Thiamine, folate, potassium, magnesium      Cocaine abuse  Patient sprinkles crack crystals in her marijuana  Wants help. Tearful. Emotional support provided.   CM consulted.       Anemia of chronic disease  Patient's anemia is currently controlled. Has not received any PRBCs to date. Etiology likely d/t chronic disease and acute blood loss post surgery.  Current CBC reviewed-   Lab Results   Component Value Date    HGB 12.5 02/19/2024    HCT 21.3 (L) 02/19/2024     Monitor serial CBC and transfuse if patient becomes hemodynamically unstable, symptomatic or H/H drops below 7/21.  - Type and screen and have 2 units on hold in preparation for surgery 2/19  - Transfused 1 unit pRBC 2/19    Essential hypertension  Chronic, controlled. Latest blood pressure and vitals reviewed-     Temp:  [98.4 °F (36.9 °C)-100.2 °F (37.9 °C)]   Pulse:  []   Resp:  [19-41]   BP: ()/(55-91)   SpO2:  [88 %-97 %] .   Home meds for hypertension were reviewed and noted below.   Hypertension Medications               amlodipine (NORVASC) 10 MG tablet Take 10 mg by mouth once daily.    furosemide (LASIX) 40 MG tablet Take 1 tablet (40 mg total) by mouth once daily.            While in the hospital, will manage blood pressure as follows; Adjust home antihypertensive regimen as follows- hold meds for now given infection/ narcotic use. Resume as warranted . May develop rebound HTN from drug/ alcohol withdrawal.    Will utilize p.r.n. blood pressure medication only if patient's blood pressure greater than 180/110 and she develops symptoms such  as worsening chest pain or shortness of breath.    Sepsis without acute organ dysfunction  Defined by leukocytosis, tachycardia and groin abscess  Continue vanc/ meropenem/clindamycin  Fu blood/ wound cx's  Maintenance IVFs; gentle  Appreciate ID recs      Chronic diastolic congestive heart failure  Patient is identified as having Diastolic (HFpEF) heart failure that is Chronic. CHF is currently controlled. Latest ECHO performed and demonstrates- Results for orders placed during the hospital encounter of 03/10/23    Echo    Interpretation Summary  · The left ventricle is normal in size with low normal systolic function.  · The estimated ejection fraction is 53%.  · There is abnormal septal wall motion.  · Indeterminate left ventricular diastolic function.  · Mild left atrial enlargement.  · Normal right ventricular size with low normal right ventricular systolic function.  · Mild right atrial enlargement.  · Mild mitral regurgitation.  · Mild tricuspid regurgitation.  · Normal central venous pressure (3 mmHg).  · The estimated PA systolic pressure is 23 mmHg.    No acute issues  Monitor fluid status    ILD (interstitial lung disease)  Noted on imaging; CT chest 2022  Stable, no acute issues. Not on home O2.      CARLINE (acute kidney injury)  Patient with acute kidney injury/acute renal failure likely due to pre-renal azotemia due to IVVD and acute tubular necrosis caused by sepsis  CARLINE is currently worsening. Baseline creatinine  0.9  - Labs reviewed- Renal function/electrolytes with Estimated Creatinine Clearance: 43.9 mL/min (A) (based on SCr of 1.6 mg/dL (H)). according to latest data. Monitor urine output and serial BMP and adjust therapy as needed. Avoid nephrotoxins and renally dose meds for GFR listed above.    - Highly suspect ATN as an etiology at this time  - Nephrology consult requested, appreciate recs  - Cont gentle IVFs/ treat infection.  - improving    Tobacco abuse  - Pt was counseled about  cessation      Type 2 diabetes mellitus with hyperglycemia, without long-term current use of insulin  Patient's FSGs are uncontrolled due to hyperglycemia on current medication regimen.  Last A1c reviewed-   Lab Results   Component Value Date    HGBA1C 8.2 (H) 04/06/2023     Most recent fingerstick glucose reviewed-   Recent Labs   Lab 02/17/24  1618 02/17/24  2034 02/18/24  0045   POCTGLUCOSE 154* 386* 200*       Current correctional scale  High  Maintain anti-hyperglycemic dose as follows-   Antihyperglycemics (From admission, onward)      Start     Stop Route Frequency Ordered    02/17/24 0900  insulin detemir U-100 (Levemir) pen 30 Units         -- SubQ Daily 02/16/24 0931    02/16/24 2100  insulin detemir U-100 (Levemir) pen 15 Units         -- SubQ Nightly 02/16/24 0931    02/16/24 1514  insulin aspart U-100 pen 0-15 Units         -- SubQ Before meals & nightly PRN 02/16/24 1515          Hold Oral hypoglycemics while patient is in the hospital.  Will give only the night dose of Levemir this pm, hold am dose for surgery          VTE Risk Mitigation (From admission, onward)           Ordered     IP VTE HIGH RISK PATIENT  Once         02/16/24 0506     Place sequential compression device  Until discontinued         02/16/24 0506                    Discharge Planning   AVTAR: 2/17/2024     Code Status: Full Code   Is the patient medically ready for discharge?:     Reason for patient still in hospital (select all that apply): Treatment  Discharge Plan A: Home with family            Critical care time spent on the evaluation and treatment of severe organ dysfunction, review of pertinent labs and imaging studies, discussions with consulting providers and discussions with patient/family: 20 minutes.      Yasmany Cary MD  Department of Hospital Medicine   [facility] - Intensive Care

## 2024-07-26 ENCOUNTER — APPOINTMENT (OUTPATIENT)
Dept: ULTRASOUND IMAGING | Facility: HOSPITAL | Age: 49
End: 2024-07-26
Attending: EMERGENCY MEDICINE
Payer: COMMERCIAL

## 2024-07-26 ENCOUNTER — HOSPITAL ENCOUNTER (EMERGENCY)
Facility: HOSPITAL | Age: 49
Discharge: HOME | End: 2024-07-26
Attending: EMERGENCY MEDICINE
Payer: COMMERCIAL

## 2024-07-26 ENCOUNTER — APPOINTMENT (OUTPATIENT)
Dept: GENERAL RADIOLOGY | Facility: HOSPITAL | Age: 49
End: 2024-07-26
Attending: EMERGENCY MEDICINE
Payer: COMMERCIAL

## 2024-07-26 ENCOUNTER — HOSPITAL ENCOUNTER (OUTPATIENT)
Age: 49
Discharge: ANOTHER HOSPITAL | End: 2024-07-26
Payer: COMMERCIAL

## 2024-07-26 VITALS
SYSTOLIC BLOOD PRESSURE: 115 MMHG | OXYGEN SATURATION: 100 % | TEMPERATURE: 98 F | BODY MASS INDEX: 33.96 KG/M2 | HEART RATE: 59 BPM | HEIGHT: 60 IN | DIASTOLIC BLOOD PRESSURE: 78 MMHG | WEIGHT: 173 LBS | RESPIRATION RATE: 14 BRPM

## 2024-07-26 VITALS
BODY MASS INDEX: 24.77 KG/M2 | SYSTOLIC BLOOD PRESSURE: 129 MMHG | TEMPERATURE: 99 F | OXYGEN SATURATION: 98 % | HEART RATE: 83 BPM | DIASTOLIC BLOOD PRESSURE: 91 MMHG | RESPIRATION RATE: 18 BRPM | HEIGHT: 70 IN | WEIGHT: 173 LBS

## 2024-07-26 DIAGNOSIS — M25.561 ACUTE PAIN OF RIGHT KNEE: Primary | ICD-10-CM

## 2024-07-26 DIAGNOSIS — M79.604 PAIN OF RIGHT LOWER EXTREMITY: Primary | ICD-10-CM

## 2024-07-26 PROCEDURE — 73562 X-RAY EXAM OF KNEE 3: CPT | Performed by: EMERGENCY MEDICINE

## 2024-07-26 PROCEDURE — 93971 EXTREMITY STUDY: CPT | Performed by: EMERGENCY MEDICINE

## 2024-07-26 PROCEDURE — 99284 EMERGENCY DEPT VISIT MOD MDM: CPT

## 2024-07-26 PROCEDURE — 99213 OFFICE O/P EST LOW 20 MIN: CPT

## 2024-07-26 RX ORDER — NAPROXEN 500 MG/1
500 TABLET ORAL 2 TIMES DAILY PRN
Qty: 20 TABLET | Refills: 0 | Status: SHIPPED | OUTPATIENT
Start: 2024-07-26 | End: 2024-07-31

## 2024-07-26 NOTE — ED INITIAL ASSESSMENT (HPI)
Right knee pain and swelling started three weeks ago, doesn't recall any injury to site. He believes it might be due to putting more pressure on right knee after left knee surgery.

## 2024-07-27 NOTE — ED INITIAL ASSESSMENT (HPI)
Patient arrives ambulatory with steady gait with c/o right knee inflammation and pain x 3 weeks, worsening. Patient denies trauma.

## 2024-07-27 NOTE — ED PROVIDER NOTES
Patient Seen in: [facility]      History     Chief Complaint   Patient presents with    Knee Pain     Stated Complaint: Knee pain    Subjective:   HPI  49-year-old male with diabetes, hypertension, and hyperlipidemia presents complaining of swelling and pain to his right medial thigh for the past 3 weeks.  He has also had some posterior right leg pain.  He denies any calf swelling.  He had a left meniscal repair in February.  He denies any fever or chills.  He denies any known injury.  He states most of the pain is to his distal right thigh.  He states that the area feels tight especially when bending his knee.    Objective:   Past Medical History:    Diabetes (HCC)    Diabetes mellitus (HCC)    Essential hypertension    High cholesterol    Hyperlipidemia              Past Surgical History:   Procedure Laterality Date    Colonoscopy      Hernia surgery      Knee surgery Left 2024                Social History     Socioeconomic History    Marital status:    Tobacco Use    Smoking status: Former     Current packs/day: 0.00     Average packs/day: 0.7 packs/day for 20.1 years (15.1 ttl pk-yrs)     Types: Cigarettes     Start date:      Quit date: 2022     Years since quittin.4    Smokeless tobacco: Never   Vaping Use    Vaping status: Never Used   Substance and Sexual Activity    Alcohol use: Yes     Comment: Occasional     Drug use: No   Other Topics Concern    Caffeine Concern Yes     Comment: 1 cup of coffee daily    Exercise Yes     Comment: sometimes    Seat Belt No    Special Diet No    Stress Concern Yes              Review of Systems   All other systems reviewed and are negative.      Positive for stated Chief Complaint: Knee Pain    Other systems are as noted in HPI.  Constitutional and vital signs reviewed.      All other systems reviewed and negative except as noted above.    Physical Exam     ED Triage Vitals [24 1851]   BP (!) 129/91   Pulse 83   Resp 18    Temp 98.8 °F (37.1 °C)   Temp src Temporal   SpO2 98 %   O2 Device None (Room air)       Current Vitals:   No data recorded          Physical Exam  Vitals and nursing note reviewed.   Constitutional:       Appearance: He is well-developed.   Cardiovascular:      Rate and Rhythm: Normal rate and regular rhythm.      Heart sounds: Normal heart sounds.   Pulmonary:      Effort: Pulmonary effort is normal.      Breath sounds: Normal breath sounds.   Musculoskeletal:      Comments: Swelling and tenderness to distal vastus medialis to right thigh. Some tenderness throughout right lateral thigh. No posterior knee tenderness or calf tenderness. Pulses intact.   Skin:     General: Skin is warm and dry.   Neurological:      Mental Status: He is alert and oriented to person, place, and time.               ED Course   Labs Reviewed - No data to display                   MDM     Medical Decision Making  49-year-old male with diabetes, hypertension, and hyperlipidemia presents complaining of swelling and pain to his right medial thigh for the past 3 weeks.  He has also had some posterior right leg pain.  He denies any calf swelling.  He had a left meniscal repair in February.  He denies any fever or chills.  He denies any known injury.  He states most of the pain is to his distal right thigh.  He states that the area feels tight especially when bending his knee.    Patient has tenderness with swelling to the right vastus medialis muscle.  He also has had intermittent right posterior leg pain for 3 weeks.  We no longer have imaging at this location.  Patient sent to the emergency room for further evaluation.      Attending Attestation  I saw and evaluated the patient personally. I was present during the key/critical portions of the service performed by the advanced practitioner, discussed the case with the advanced practitioner and provided a substantive portion of the medical decision making. I reviewed the note, findings and  plan, and agree or disagree as noted.  Dr. Selena Kay  Disposition and Plan     Clinical Impression:  1. Pain of right lower extremity         Disposition:  Ic to ed  7/26/2024  7:08 pm    Follow-up:  No follow-up provider specified.        Medications Prescribed:  Discharge Medication List as of 7/26/2024  7:11 PM

## 2024-07-27 NOTE — ED PROVIDER NOTES
Patient Seen in: [facility] Emergency Department      History     Chief Complaint   Patient presents with    Leg Pain     Stated Complaint: sent by IC for US of RLE    Subjective:   HPI    49-year-old male with a past medical history as below presents with pain and swelling in his right knee that started about 3 weeks ago.  He does not recall any preceding injury.  He states he feels swelling going into his lower thigh area pointing to the suprapatellar region.  He also reports some pain in his calf.  Patient states he was seen at urgent care and sent to ED for an ultrasound.  He states he had some swelling in his left knee that was from a torn meniscus and had surgery earlier this year.  He denies any fever or chills.  Denies redness or warmth of his knee.  Denies calf swelling.  Denies pain or swelling behind the knee.    Objective:   Past Medical History:    Diabetes (HCC)    Diabetes mellitus (HCC)    Essential hypertension    High cholesterol    Hyperlipidemia              Past Surgical History:   Procedure Laterality Date    Colonoscopy      Hernia surgery      Knee surgery Left 2024                Social History     Socioeconomic History    Marital status:    Tobacco Use    Smoking status: Former     Current packs/day: 0.00     Average packs/day: 0.7 packs/day for 20.1 years (15.1 ttl pk-yrs)     Types: Cigarettes     Start date:      Quit date: 2022     Years since quittin.4    Smokeless tobacco: Never   Vaping Use    Vaping status: Never Used   Substance and Sexual Activity    Alcohol use: Yes     Comment: Occasional     Drug use: No   Other Topics Concern    Caffeine Concern Yes     Comment: 1 cup of coffee daily    Exercise Yes     Comment: sometimes    Seat Belt No    Special Diet No    Stress Concern Yes              Review of Systems    Positive for stated Chief Complaint: Leg Pain    Other systems are as noted in HPI.  Constitutional and vital signs reviewed.      All  other systems reviewed and negative except as noted above.    Physical Exam     ED Triage Vitals [07/26/24 1939]   /80   Pulse 67   Resp 18   Temp 98 °F (36.7 °C)   Temp src Esophageal   SpO2 98 %   O2 Device None (Room air)       Current Vitals:   Vital Signs  BP: 115/78  Pulse: 59  Resp: 14  Temp: 98 °F (36.7 °C)  Temp src: Esophageal  MAP (mmHg): 89    Oxygen Therapy  SpO2: 100 %  O2 Device: None (Room air)            Physical Exam  Vitals and nursing note reviewed.   Constitutional:       General: He is not in acute distress.     Appearance: He is well-developed. He is not ill-appearing.   HENT:      Head: Normocephalic and atraumatic.      Mouth/Throat:      Mouth: Mucous membranes are moist.   Eyes:      General: No scleral icterus.     Extraocular Movements: Extraocular movements intact.   Musculoskeletal:      Cervical back: Neck supple.      Comments: Right knee with mild tenderness along the medial joint line  Mild joint effusion  Good range of motion but pain with flexion greater than 120 degrees  No erythema or warmth    Mild left calf tenderness  No edema or erythema   Skin:     General: Skin is warm and dry.      Capillary Refill: Capillary refill takes less than 2 seconds.   Neurological:      Mental Status: He is alert and oriented to person, place, and time.      GCS: GCS eye subscore is 4. GCS verbal subscore is 5. GCS motor subscore is 6.   Psychiatric:         Mood and Affect: Mood normal.         Behavior: Behavior normal.               ED Course   Labs Reviewed - No data to display          US VENOUS DOPPLER LEG RIGHT - DIAG IMG (CPT=93971)    Result Date: 7/26/2024  PROCEDURE:  US VENOUS DOPPLER LEG RIGHT - DIAG IMG (CPT=93971)  COMPARISON:  None.  INDICATIONS:  right thigh/calf pain  TECHNIQUE:  Real time, grey scale, and duplex ultrasound was used to evaluate the lower extremity venous system. B-mode two-dimensional images of the vascular structures, Doppler spectral analysis, and  color flow.  Doppler imaging were performed.  The following veins were imaged:  Common, deep, and superficial femoral, popliteal, sapheno-femoral junction, posterior tibial veins, and the contralateral common femoral vein.  PATIENT STATED HISTORY: (As transcribed by Technologist)     FINDINGS:  EXTREMITY EXAMINED:  Right lower SAPHENOFEMORAL JUNCTION:  No reflux. THROMBI:  None visible. COMPRESSION:  Normal compressibility, phasicity, and augmentation. OTHER:  Negative.            CONCLUSION:  No evidence of right lower extremity DVT.   LOCATION:  [facility]    Dictated by (CST): Pedro Reynoso MD on 7/26/2024 at 10:33 PM     Finalized by (CST): Pedro Reynoso MD on 7/26/2024 at 10:33 PM       XR KNEE (3 VIEWS), RIGHT (CPT=73562)    Result Date: 7/26/2024  PROCEDURE:  XR KNEE ROUTINE (3 VIEWS), RIGHT (CPT=73562)  TECHNIQUE:  Three views were obtained including patellar view.  COMPARISON:  None.  INDICATIONS:  Knee pain  PATIENT STATED HISTORY: (As transcribed by Technologist)  Patient offered no additional history at this time.     FINDINGS:  BONES:  Mild patellofemoral narrowing is present.  No acute fracture or dislocation is seen. SOFT TISSUES:  Negative.  No visible soft tissue swelling. EFFUSION:  None visible. OTHER:  If clinical symptoms persist then recommend follow-up imaging.            CONCLUSION:  See above.   LOCATION:  [facility]   Dictated by (CST): Pedro Reynoso MD on 7/26/2024 at 8:42 PM     Finalized by (CST): Pedro Reynoso MD on 7/26/2024 at 8:43 PM             MDM      49-year-old male with a past medical history as below presents with pain and swelling in his right knee that started about 3 weeks ago.     Differential includes but is not limited to osteoarthritis, meniscal tear, tendinitis, unlikely DVT.  No clinical findings concerning for gout or septic arthritis.    Independent interpretation of knee x-rays show mild arthritis.  Independent interpretation of venous duplex shows no evidence of DVT.  Radiology  reports reviewed as above.    Pain and swelling may be related to osteoarthritis versus meniscal injury.  Instructed to follow-up with Ortho for further outpatient workup and management.  Will give Rx for naproxen.  Instructed to rest, ice and elevate.  Return precaution discussed.                           Medical Decision Making  Amount and/or Complexity of Data Reviewed  Radiology: ordered and independent interpretation performed. Decision-making details documented in ED Course.    Risk  Prescription drug management.        Disposition and Plan     Clinical Impression:  1. Acute pain of right knee         Disposition:  Discharge  7/26/2024 10:38 pm    Follow-up:  Kyung Dsouza MD  [address] [place] [address] [phone]    Schedule an appointment as soon as possible for a visit      Ayah Urbina MD  [address] [address] [phone]    Schedule an appointment as soon as possible for a visit            Medications Prescribed:  Current Discharge Medication List        START taking these medications    Details   naproxen 500 MG Oral Tab Take 1 tablet (500 mg total) by mouth 2 (two) times daily as needed.  Qty: 20 tablet, Refills: 0

## 2024-07-31 ENCOUNTER — OFFICE VISIT (OUTPATIENT)
Dept: INTERNAL MEDICINE CLINIC | Facility: CLINIC | Age: 49
End: 2024-07-31
Payer: COMMERCIAL

## 2024-07-31 VITALS
WEIGHT: 169.81 LBS | OXYGEN SATURATION: 96 % | HEART RATE: 74 BPM | SYSTOLIC BLOOD PRESSURE: 120 MMHG | BODY MASS INDEX: 33 KG/M2 | TEMPERATURE: 98 F | RESPIRATION RATE: 15 BRPM | DIASTOLIC BLOOD PRESSURE: 70 MMHG

## 2024-07-31 DIAGNOSIS — K40.90 UNILATERAL INGUINAL HERNIA WITHOUT OBSTRUCTION OR GANGRENE, RECURRENCE NOT SPECIFIED: ICD-10-CM

## 2024-07-31 DIAGNOSIS — R29.898 DIFFICULTY BEARING WEIGHT ON RIGHT LOWER EXTREMITY: ICD-10-CM

## 2024-07-31 DIAGNOSIS — M25.469 KNEE SWELLING: ICD-10-CM

## 2024-07-31 DIAGNOSIS — E11.65 TYPE 2 DIABETES MELLITUS WITH HYPERGLYCEMIA, WITHOUT LONG-TERM CURRENT USE OF INSULIN (HCC): Primary | ICD-10-CM

## 2024-07-31 LAB — HEMOGLOBIN A1C: 6.8 % (ref 4.3–5.6)

## 2024-07-31 PROCEDURE — 3044F HG A1C LEVEL LT 7.0%: CPT | Performed by: INTERNAL MEDICINE

## 2024-07-31 PROCEDURE — 99214 OFFICE O/P EST MOD 30 MIN: CPT | Performed by: INTERNAL MEDICINE

## 2024-07-31 PROCEDURE — 3078F DIAST BP <80 MM HG: CPT | Performed by: INTERNAL MEDICINE

## 2024-07-31 PROCEDURE — G2211 COMPLEX E/M VISIT ADD ON: HCPCS | Performed by: INTERNAL MEDICINE

## 2024-07-31 PROCEDURE — 3074F SYST BP LT 130 MM HG: CPT | Performed by: INTERNAL MEDICINE

## 2024-07-31 PROCEDURE — 83036 HEMOGLOBIN GLYCOSYLATED A1C: CPT | Performed by: INTERNAL MEDICINE

## 2024-07-31 RX ORDER — NAPROXEN 500 MG/1
500 TABLET ORAL 2 TIMES DAILY PRN
Qty: 20 TABLET | Refills: 0 | Status: SHIPPED | OUTPATIENT
Start: 2024-07-31 | End: 2024-08-10

## 2024-07-31 NOTE — PROGRESS NOTES
Subjective:   Hans Simeon is a 49 year old male  who presents for Swelling     Went to urgent care on 7/26/24 for R knee pain. It was believed that pain/swelling related to OA vs meniscal injury. US dopplers negative.   Knee xray showed OA.   Per pt symptoms have been going on for about 1 month. Denies trauma/gout symptoms.   Pain improved but still with swelling and difficulty when bearing weight.     Also reports R inguinal bump. Hx of inguinal hernia.   Notes more with certain activity.     DM2- due for A1c. Done in office.  6.8  Controlled. Denies hypoglycemia.   History/Other:    Chief Complaint Reviewed and Verified  No Further Nursing Notes to   Review  Tobacco Reviewed  Allergies Reviewed  Medications Reviewed         Current Outpatient Medications   Medication Sig Dispense Refill    naproxen 500 MG Oral Tab Take 1 tablet (500 mg total) by mouth 2 (two) times daily as needed. 20 tablet 0    SITagliptin Phosphate (JANUVIA) 50 MG Oral Tab Take 1 tablet (50 mg total) by mouth daily. 90 tablet 3    Meloxicam 15 MG Oral Tab Take 1 tablet (15 mg total) by mouth daily. 14 tablet 1    Cholecalciferol (VITAMIN D) 50 MCG (2000 UT) Oral Tab Take by mouth.      metFORMIN HCl 1000 MG Oral Tab Take 1 tablet (1,000 mg total) by mouth 2 (two) times daily with meals. 180 tablet 3       Review of Systems:  Pertinent items are noted in HPI.  A comprehensive 10 point review of systems was completed.  Pertinent positives and negatives noted in the the HPI.        Objective:   /70 (BP Location: Left arm, Patient Position: Sitting, Cuff Size: adult)   Pulse 74   Temp 98 °F (36.7 °C) (Temporal)   Resp 15   Wt 169 lb 12.8 oz (77 kg)   SpO2 96%   BMI 33.16 kg/m²  Estimated body mass index is 33.16 kg/m² as calculated from the following:    Height as of 7/26/24: 5' (1.524 m).    Weight as of this encounter: 169 lb 12.8 oz (77 kg).  PHYSICAL EXAM:   General: no acute distress   Respiratory: no increased work of  breathing; good air exchange; CTAB; no crackles or wheezing   Cardiovascular: RRR; S1, S2; no edema   Neurological: awake, alert, oriented x3; CNII-XII grossly intact;   MSK: good ROM overall. Swelling of R knee.   Behavioral/Psych: euthymic; appropriate affect   Skin: no rashes on BL LE  Groin: possible hernia vs other on R    Assessment & Plan:   Hans was seen today for swelling.    Diagnoses and all orders for this visit:    Type 2 diabetes mellitus with hyperglycemia, without long-term current use of insulin ([facility])  -     POC Hgb A1C-controlled 6.8  -continue metformin, januvia  -follow-up in 4 months or sooner prn     Knee swelling  Difficulty bearing weight on right lower extremity  -imaging as above   -     MRI KNEE, RIGHT (XFG=64881); Future  -     Ortho Referral - In Network  -supportive care    Unilateral inguinal hernia without obstruction or gangrene, recurrence not specified  Comments:  reports prior surgery  Orders:  -     US GROIN RIGHT LIMITED (CPT=76882); Future      Ayah Narayan MD

## 2024-09-05 ENCOUNTER — HOSPITAL ENCOUNTER (OUTPATIENT)
Dept: MRI IMAGING | Age: 49
Discharge: HOME | End: 2024-09-05
Attending: INTERNAL MEDICINE
Payer: COMMERCIAL

## 2024-09-05 DIAGNOSIS — R29.898 DIFFICULTY BEARING WEIGHT ON RIGHT LOWER EXTREMITY: ICD-10-CM

## 2024-09-05 DIAGNOSIS — M25.469 KNEE SWELLING: ICD-10-CM

## 2024-09-05 PROCEDURE — 73721 MRI JNT OF LWR EXTRE W/O DYE: CPT | Performed by: INTERNAL MEDICINE

## 2024-09-05 NOTE — TELEPHONE ENCOUNTER
Requesting    Name from pharmacy: metFORMIN HCl 1000 MG Oral Tablet         Will file in chart as: METFORMIN HCL 1000 MG Oral Tab    Sig: TOME 1 TABLETA POR LA BOCA DOS  VECES AL ALIREZA CON LAS COMIDAS    Disp: 180 tablet    Refills: 3    Start: 9/4/2024    Class: Normal    To pharmacy: Please send a replace/new response with 90-Day Supply if appropriate to maximize member benefit. Requesting 1 year supply.    Last ordered: 10 months ago (11/2/2023) by Ayah Narayan MD    Last refill: 7/11/2024    Rx #: [id number]    Diabetes Medication Protocol Nsyysl4709/04/2024 09:47 PM   Protocol Details Last A1C < 7.5 and within past 6 months    In person appointment or virtual visit in the past 6 mos or appointment in next 3 mos    Microalbumin procedure in past 12 months or taking ACE/ARB    EGFRCR or GFRNAA > 50    GFR in the past 12 months      To be filled at: [address] [phone], [phone]     LOV: 07/31/24 w/ DVF  RTC: 12/09/24  Last Relevant Labs: 01/22/24      Future Appointments   Date Time Provider Department Center   9/5/2024  5:45 PM PF MRI RM1 (1.5T) PF MRI [place]   9/6/2024  5:30 PM BBK US RM1 BB[facility] US [place]   9/9/2024  2:40 PM Kyung [facility]souza MD [id number]   12/9/2024  4:40 PM Ayah Urbina MD [facility] 8 [facility] [place]

## 2024-09-06 ENCOUNTER — HOSPITAL ENCOUNTER (OUTPATIENT)
Dept: ULTRASOUND IMAGING | Age: 49
Discharge: HOME | End: 2024-09-06
Attending: INTERNAL MEDICINE
Payer: COMMERCIAL

## 2024-09-06 DIAGNOSIS — K40.90 UNILATERAL INGUINAL HERNIA WITHOUT OBSTRUCTION OR GANGRENE, RECURRENCE NOT SPECIFIED: ICD-10-CM

## 2024-09-06 PROCEDURE — 76882 US LMTD JT/FCL EVL NVASC XTR: CPT | Performed by: INTERNAL MEDICINE

## 2024-09-09 ENCOUNTER — OFFICE VISIT (OUTPATIENT)
Dept: ORTHOPEDICS CLINIC | Facility: CLINIC | Age: 49
End: 2024-09-09
Payer: COMMERCIAL

## 2024-09-09 VITALS — BODY MASS INDEX: 25.05 KG/M2 | HEIGHT: 70 IN | WEIGHT: 175 LBS

## 2024-09-09 DIAGNOSIS — M94.262 CHONDROMALACIA OF LEFT KNEE: ICD-10-CM

## 2024-09-09 DIAGNOSIS — S83.231A COMPLEX TEAR OF MEDIAL MENISCUS OF RIGHT KNEE AS CURRENT INJURY, INITIAL ENCOUNTER: Primary | ICD-10-CM

## 2024-09-09 PROCEDURE — 99214 OFFICE O/P EST MOD 30 MIN: CPT | Performed by: ORTHOPAEDIC SURGERY

## 2024-09-09 PROCEDURE — 3008F BODY MASS INDEX DOCD: CPT | Performed by: ORTHOPAEDIC SURGERY

## 2024-09-09 RX ORDER — MELOXICAM 15 MG/1
15 TABLET ORAL DAILY
Qty: 14 TABLET | Refills: 1 | Status: SHIPPED | OUTPATIENT
Start: 2024-09-09

## 2024-09-09 NOTE — H&P
Orthopaedic Surgery  [address] [place] [id number]  [phone]     NEW PATIENT VISIT - HISTORY AND PHYSICAL EXAMINATION     Name: Hans Simeon   MRN: [id number]  Date: 9/9/2024     CC: Right Knee Pain    REFERRED BY: Ayah Narayan MD    HPI:   Hans Simeon is a very pleasant 49 year old male who presents today for evaluation of right knee pain ongoing for 1 year. Pain is 5/10 with locking and instability. This has been worsening over the last 2 months. He obtained a MRI demonstrating a medial meniscus tear.     Patient is well known to our team for Left knee scope, medial meniscectomy, extensive debridement on 02/22/2024. This is currently aggravated only with stairs. Overall, doing very well.    PMH:   Past Medical History:    Diabetes (HCC)    Diabetes mellitus (HCC)    Essential hypertension    High cholesterol    Hyperlipidemia       PAST SURGICAL HX:  Past Surgical History:   Procedure Laterality Date    Colonoscopy      Hernia surgery  1992    Knee surgery Left 02/2024       FAMILY HX:  Family History   Problem Relation Age of Onset    Diabetes Mother     Cancer Mother         uterine cancer    Cancer Sister         breast cancer    Cancer Paternal Grandmother         ? cancer       ALLERGIES:  Patient has no known allergies.    MEDICATIONS:   Current Outpatient Medications   Medication Sig Dispense Refill    METFORMIN HCL 1000 MG Oral Tab TOME 1 TABLETA POR LA BOCA DOS  VECES AL ALIREZA CON LAS COMIDAS 180 tablet 3    SITagliptin Phosphate (JANUVIA) 50 MG Oral Tab Take 1 tablet (50 mg total) by mouth daily. 90 tablet 3    Cholecalciferol (VITAMIN D) 50 MCG (2000 UT) Oral Tab Take by mouth.         ROS: A comprehensive 14 point review of systems was performed and was negative aside from the aforementioned per history of present illness.    SOCIAL HX:  Social History     Tobacco Use    Smoking status: Former     Current packs/day: 0.00     Average packs/day: 0.7 packs/day for 20.1  years (15.1 ttl pk-yrs)     Types: Cigarettes     Start date:      Quit date: 2022     Years since quittin.5    Smokeless tobacco: Never   Substance Use Topics    Alcohol use: Yes     Comment: Occasional        PE:   Vitals:    24 1453   Weight: 175 lb   Height: 5' 10\" (1.778 m)     Estimated body mass index is 25.11 kg/m² as calculated from the following:    Height as of this encounter: 5' 10\" (1.778 m).    Weight as of this encounter: 175 lb.    Physical Exam  Constitutional:       Appearance: Normal appearance.   HENT:      Head: Normocephalic and atraumatic.   Eyes:      Extraocular Movements: Extraocular movements intact.   Neck:      Musculoskeletal: Normal range of motion and neck supple.   Cardiovascular:      Pulses: Normal pulses.   Pulmonary:      Effort: Pulmonary effort is normal. No respiratory distress.   Abdominal:      General: There is no distension.   Skin:     General: Skin is warm.      Capillary Refill: Capillary refill takes less than 2 seconds.      Findings: No bruising.   Neurological:      General: No focal deficit present.      Mental Status: Alert.   Psychiatric:         Mood and Affect: Mood normal.     Examination of the right knee demonstrates:   Skin is intact, warm and dry.   Atrophy: none    Effusion: small    Joint line tenderness: medial  Crepitation: none   Dane: Negative   Patellar mobility: normal without apprehension  J-sign: none    ROM: Extension full  Flexion 140 degrees  ACL:  Negative Lachman, Negative Pivot Shift   PCL:  Negative Posterior Drawer  Collateral Ligaments: Stable to Varus and Valgus stress at 0 and 30 degrees  Strength: normal   Hip joint: normal pain-free ROM   Gait:  normal   Leg length: equal and symmetric  Alignment:  neutral     No obvious peripheral edema noted.   Distal neurovascular exam demonstrates normal perfusion, intact sensation to light touch and full strength.     Examination of the contralateral knee demonstrates:  No  significant atrophy, swelling or effusion. Full range of motion. Neurovascularly intact distally    Radiographic Examination/Diagnostics:  Knee MRI personally viewed, independently interpreted and radiology report was reviewed.    MRI KNEE, RIGHT (HJO=13159)    Result Date: 9/6/2024  PROCEDURE:  MRI KNEE, RIGHT (CPT=73721)  COMPARISON:  [facility] , XR, XR KNEE (3 VIEWS), RIGHT (CPT=73562), 7/26/2024, 8:33 PM.  INDICATIONS:  R29.898 Difficulty bearing weight on right lower extremity M25.469 Knee swelling  TECHNIQUE:  Axial, coronal, and sagittal proton density with and without fat saturation images were obtained.  PATIENT STATED HISTORY: (As transcribed by Technologist)  Patient complains of right knee pain and swelling which began about 1 year ago with no injury or fall    FINDINGS:  LIGAMENTS:          The ACL, PCL, patellar retinacula, and collateral ligament complexes are intact. MENISCI:            There is a nondisplaced tear which appears to be longitudinal oblique involving the posterior horn of the medial meniscus extending to the tibial articular surface for approximately 1 cm.  The lateral meniscus is unremarkable.  TENDONS:            The tendinous insertions about the knee are intact without significant tendinosis or tears. MUSCULATURE:        No evidence of strain, edema, or atrophy. BONY COMPARTMENTS:  There is full-thickness cartilage tear involving the femoral articular surface measuring 6 mm in length and approximately 2 cm in AP dimension.  Moderate subchondral edema noted within the medial femoral condyle related to degenerative change.  There is minimal cartilage thinning involving the lateral compartment and patellofemoral compartment. SYNOVIUM:           There is a small joint effusion without evidence of synovitis.  There is no intra-articular body.             CONCLUSION:     1. Nondisplaced longitudinal oblique tear involving the posterior horn of the medial meniscus.    2. Moderate  osteoarthritis with the cartilage fissuring involving the medial compartment.     LOCATION:  [facility]          Dictated by (CST): Rusty Sauer MD on 9/06/2024 at 8:20 AM     Finalized by (CST): Rusty Sauer MD on 9/06/2024 at 8:26 AM         IMPRESSION: Hans Simeon is a 49 year old male with right medial meniscus tear and left knee chondromalacia sustained 1 year ago.    We elected maximize conservative management with Meloxicam. Intra-articular corticosteroid and ketorolac injection will be deferred until symptoms worsen.    I additionally discussed the role of possible future consideration of knee arthroscopy if deemed necessary.    PLAN:   We had a detailed discussion outlining the etiology, anatomy, pathophysiology, and natural history of patient's findings. Imaging was reviewed in detail and correlated to a 3-dimensional model of the knee.     We reviewed the treatment of this disease condition.  Fortunately, treatment is amenable to conservative treatment which we chose to optimize at today's visit.  After a discussion of a variety of conservative treatment options, I recommended intra-articular injection with corticosteroid and ketorolac.       At this time, patient will defer injection and will follow up if symptoms worsen.    We prescribed Meloxicam 15 mg.    The patient had the opportunity to ask questions and all questions were answered appropriately.      FOLLOW-UP:  Return to clinic on an as needed basis.       Kyung Dsouza MD  Knee, Shoulder, & Elbow Surgery / Sports Medicine Specialist  Orthopaedic Surgery  [address] [place] [address] [id number] [place] [address].org  [email or web address]  t: [phone]  o: [phone]  f: [phone]    This note was dictated using Dragon software.  While it was briefly proofread prior to completion, some grammatical, spelling, and word choice errors due to dictation may still occur.

## 2024-10-02 ENCOUNTER — TELEPHONE (OUTPATIENT)
Dept: INTERNAL MEDICINE CLINIC | Facility: CLINIC | Age: 49
End: 2024-10-02

## 2024-11-14 DIAGNOSIS — E11.65 TYPE 2 DIABETES MELLITUS WITH HYPERGLYCEMIA, UNSPECIFIED WHETHER LONG TERM INSULIN USE (HCC): ICD-10-CM

## 2024-11-14 RX ORDER — ATORVASTATIN CALCIUM 20 MG/1
20 TABLET, FILM COATED ORAL NIGHTLY
Qty: 90 TABLET | Refills: 3 | OUTPATIENT
Start: 2024-11-14

## 2024-11-14 NOTE — TELEPHONE ENCOUNTER
Requesting    SITagliptin Phosphate (JANUVIA) 50 MG Oral Tab         Sig: Take 1 tablet (50 mg total) by mouth daily.    Disp: 90 tablet    Refills: 0    Start: 11/14/2024    Class: Normal    For: Type 2 diabetes mellitus with hyperglycemia, unspecified whether long term insulin use (HCC)    To pharmacy: Please send a replace/new response with 90-Day Supply if appropriate to maximize member benefit. Requesting 1 year supply.    Last ordered: 7 months ago (4/1/2024) by Ayah Narayan MD    Diabetes Medication Protocol Zufson8711/14/2024 07:37 AM   Protocol Details Last A1C < 7.5 and within past 6 months    In person appointment or virtual visit in the past 6 mos or appointment in next 3 mos    Microalbumin procedure in past 12 months or taking ACE/ARB    EGFRCR or GFRNAA > 50    GFR in the past 12 months      To be filled at: [address] [phone], [phone]     LOV: 07/31/24 w/ DVF  RTC: 12/09/24  Last Relevant Labs: 07/31/24    Future Appointments   Date Time Provider Department Center   12/9/2024  4:40 PM Ayah Urbina MD [facility] 8 [facility] [place]

## 2024-11-14 NOTE — TELEPHONE ENCOUNTER
Patient hasn't taken atorvastatin in months no longer on it last cholesterol check was 01/2024 Lipid Panel Normal. Patient requesting refill for Michaeluvia    Requesting    Name from pharmacy: Atorvastatin Calcium 20 MG Oral Tablet         Will file in chart as: ATORVASTATIN 20 MG Oral Tab    The original prescription was discontinued on 2/15/2024 by Genny Payan CMA for the following reason: Patient discontinued. Renewing this prescription may not be appropriate.    Sig: TOME 1 TABLETA POR LA BOCA CADA  NOCHE    Disp: 90 tablet    Refills: 3    Start: 11/14/2024    Class: Normal    Non-formulary    To pharmacy: Please send a replace/new response with 90-Day Supply if appropriate to maximize member benefit. Requesting 1 year supply.    Last ordered: 10 months ago (1/8/2024) by Ayah Narayan MD    Last refill: 9/19/2024    Rx #: [id number]    Cholesterol Medication Protocol Bsehsp7911/14/2024 03:44 AM   Protocol Details ALT < 80    ALT resulted within past year    Lipid panel within past 12 months    In person appointment or virtual visit in the past 12 mos or appointment in next 3 mos      To be filled at: [address] [phone], [phone]     LOV: 07/31/24 w/ DVF  RTC: 12/09/24  Last Relevant Labs: 01/22/24    Future Appointments   Date Time Provider Department Center   12/9/2024  4:40 PM Ayah Urbina MD [facility] 8 [facility] [place]

## 2024-12-09 ENCOUNTER — OFFICE VISIT (OUTPATIENT)
Dept: INTERNAL MEDICINE CLINIC | Facility: CLINIC | Age: 49
End: 2024-12-09
Payer: COMMERCIAL

## 2024-12-09 VITALS
BODY MASS INDEX: 26.64 KG/M2 | SYSTOLIC BLOOD PRESSURE: 100 MMHG | HEIGHT: 68.5 IN | HEART RATE: 78 BPM | WEIGHT: 177.81 LBS | OXYGEN SATURATION: 98 % | DIASTOLIC BLOOD PRESSURE: 60 MMHG | TEMPERATURE: 98 F

## 2024-12-09 DIAGNOSIS — S83.249S TEAR OF MEDIAL MENISCUS OF KNEE, UNSPECIFIED LATERALITY, UNSPECIFIED TEAR TYPE, UNSPECIFIED WHETHER OLD OR CURRENT TEAR, SEQUELA: ICD-10-CM

## 2024-12-09 DIAGNOSIS — E11.65 TYPE 2 DIABETES MELLITUS WITH HYPERGLYCEMIA, UNSPECIFIED WHETHER LONG TERM INSULIN USE (HCC): ICD-10-CM

## 2024-12-09 DIAGNOSIS — Z00.00 ANNUAL PHYSICAL EXAM: Primary | ICD-10-CM

## 2024-12-09 DIAGNOSIS — K51.20 ULCERATIVE PROCTITIS WITHOUT COMPLICATION (HCC): ICD-10-CM

## 2024-12-09 PROBLEM — K51.011 ULCERATIVE CHRONIC PANCOLITIS WITH RECTAL BLEEDING (HCC): Status: RESOLVED | Noted: 2023-09-22 | Resolved: 2024-12-09

## 2024-12-09 NOTE — PROGRESS NOTES
Subjective:   Hans Simeon is a 49 year old male  who presents for Physical (Reviewed Preventative/Wellness form with patient. )     Reports     Denies immediate family hx of  colon or prostate cancer.  Sister with breast cancer    UTD with colonoscopy done 9/2023; per notes due 10 years 2032  DM2- has been controlled. due for labs. Reports fasting BG 110s-120s  Denies cp/sob/angina/[place]    Low vitamin d- supplements. Check labs     UC- controlled and utd with cscope.     Remainder of ROS negative     History/Other:    Chief Complaint Reviewed and Verified  Nursing Notes Reviewed and   Verified  Tobacco Reviewed  Allergies Reviewed  Medications Reviewed    Medical History Reviewed  Surgical History Reviewed  Family History   Reviewed  Social History Reviewed         Current Outpatient Medications   Medication Sig Dispense Refill    SITagliptin Phosphate (JANUVIA) 50 MG Oral Tab Take 1 tablet (50 mg total) by mouth daily. 90 tablet 3    Meloxicam 15 MG Oral Tab Take 1 tablet (15 mg total) by mouth daily. 14 tablet 1    METFORMIN HCL 1000 MG Oral Tab TOME 1 TABLETA POR LA BOCA DOS  VECES AL ALIREZA CON LAS COMIDAS 180 tablet 3    Cholecalciferol (VITAMIN D) 50 MCG (2000 UT) Oral Tab Take by mouth.         Review of Systems:  Pertinent items are noted in HPI.  A comprehensive 10 point review of systems was completed.  Pertinent positives and negatives noted in the the HPI.        Objective:   /60 (BP Location: Right arm, Patient Position: Sitting, Cuff Size: adult)   Pulse 78   Temp 98 °F (36.7 °C) (Temporal)   Ht 5' 8.5\" (1.74 m)   Wt 177 lb 12.8 oz (80.6 kg)   SpO2 98%   BMI 26.64 kg/m²  Estimated body mass index is 26.64 kg/m² as calculated from the following:    Height as of this encounter: 5' 8.5\" (1.74 m).    Weight as of this encounter: 177 lb 12.8 oz (80.6 kg).  PHYSICAL EXAM:   General: no acute distress   Eyes: pupils equal and reactive; EOMI; sclera normal; conjunctiva normal   ENT  without erythema or exudate  Neck: trachea midline; no adenopathy; thyroid not enlarged   Hematologic/lymphatic:no cervical lymphadenopathy; no supraclavicular adenopathy   Respiratory: no increased work of breathing; good air exchange; CTAB; no crackles or wheezing   Cardiovascular: RRR; S1, S2; no murmurs; no carotid bruits; no edema   Gastrointestinal: normal bowel sounds; soft; non-distended; non-tender  Neurological: awake, alert, oriented x3; CNII-XII grossly intact;  MSK: slight stiffness in RLE.  strength 5/5  Behavioral/Psych: euthymic; appropriate affect       Assessment & Plan:   Hans was seen today for physical.    Diagnoses and all orders for this visit:    Annual physical exam  -     Vitamin D; Future  -     CBC With Differential With Platelet; Future  -     Comp Metabolic Panel (14); Future  -     Hemoglobin A1C; Future  -     TSH W Reflex To Free T4; Future  -     Lipid Panel; Future  -     Microalb/Creat Ratio, Random Urine [E]; Future  -     PSA, Total (Screening) [E]; Future  -     Fluzone Trivalent Vaccine, 6mo+ (21391)    Type 2 diabetes mellitus with hyperglycemia, unspecified whether long term insulin use (HCC)  -     Vitamin D; Future  -     CBC With Differential With Platelet; Future  -     Comp Metabolic Panel (14); Future  -     Hemoglobin A1C; Future  -     TSH W Reflex To Free T4; Future  -     Lipid Panel; Future  -     Microalb/Creat Ratio, Random Urine [E]; Future  -januvia, metformin       Ulcerative proctitis without complication (HCC)  -controlled  -as per GI    Tear of medial meniscus of knee, unspecified laterality, unspecified tear type, unspecified whether old or current tear, sequela  -pt reports that R knee sometimes with pain at times but improved. To follow-up with ortho as directed                   Ayah Narayan MD

## 2025-01-04 ENCOUNTER — LAB ENCOUNTER (OUTPATIENT)
Dept: LAB | Age: 50
End: 2025-01-04
Attending: INTERNAL MEDICINE
Payer: COMMERCIAL

## 2025-01-04 DIAGNOSIS — E11.9 TYPE 2 DIABETES MELLITUS WITHOUT COMPLICATION, WITHOUT LONG-TERM CURRENT USE OF INSULIN (HCC): ICD-10-CM

## 2025-01-04 DIAGNOSIS — Z00.00 ANNUAL PHYSICAL EXAM: ICD-10-CM

## 2025-01-04 LAB
ALBUMIN SERPL-MCNC: 4.7 G/DL (ref 3.2–4.8)
ALBUMIN/GLOB SERPL: 1.7 {RATIO} (ref 1–2)
ALP LIVER SERPL-CCNC: 101 U/L
ALT SERPL-CCNC: 14 U/L
ANION GAP SERPL CALC-SCNC: 11 MMOL/L (ref 0–18)
AST SERPL-CCNC: 11 U/L (ref ?–34)
BASOPHILS # BLD AUTO: 0.03 X10(3) UL (ref 0–0.2)
BASOPHILS NFR BLD AUTO: 0.5 %
BILIRUB SERPL-MCNC: 0.5 MG/DL (ref 0.3–1.2)
BUN BLD-MCNC: 14 MG/DL (ref 9–23)
CALCIUM BLD-MCNC: 9.6 MG/DL (ref 8.7–10.4)
CHLORIDE SERPL-SCNC: 103 MMOL/L (ref 98–112)
CHOLEST SERPL-MCNC: 148 MG/DL (ref ?–200)
CO2 SERPL-SCNC: 24 MMOL/L (ref 21–32)
COMPLEXED PSA SERPL-MCNC: 0.32 NG/ML (ref ?–4)
CREAT BLD-MCNC: 1.21 MG/DL
CREAT UR-SCNC: 207.5 MG/DL
EGFRCR SERPLBLD CKD-EPI 2021: 72 ML/MIN/1.73M2 (ref 60–?)
EOSINOPHIL # BLD AUTO: 0.12 X10(3) UL (ref 0–0.7)
EOSINOPHIL NFR BLD AUTO: 1.9 %
ERYTHROCYTE [DISTWIDTH] IN BLOOD BY AUTOMATED COUNT: 12.7 %
EST. AVERAGE GLUCOSE BLD GHB EST-MCNC: 157 MG/DL (ref 68–126)
FASTING PATIENT LIPID ANSWER: YES
FASTING STATUS PATIENT QL REPORTED: YES
GLOBULIN PLAS-MCNC: 2.8 G/DL (ref 2–3.5)
GLUCOSE BLD-MCNC: 131 MG/DL (ref 70–99)
HBA1C MFR BLD: 7.1 % (ref ?–5.7)
HCT VFR BLD AUTO: 47.5 %
HDLC SERPL-MCNC: 42 MG/DL (ref 40–59)
HGB BLD-MCNC: 16.3 G/DL
IMM GRANULOCYTES # BLD AUTO: 0.04 X10(3) UL (ref 0–1)
IMM GRANULOCYTES NFR BLD: 0.6 %
LDLC SERPL CALC-MCNC: 79 MG/DL (ref ?–100)
LYMPHOCYTES # BLD AUTO: 2.74 X10(3) UL (ref 1–4)
LYMPHOCYTES NFR BLD AUTO: 42.5 %
MCH RBC QN AUTO: 30.2 PG (ref 26–34)
MCHC RBC AUTO-ENTMCNC: 34.3 G/DL (ref 31–37)
MCV RBC AUTO: 88 FL
MICROALBUMIN UR-MCNC: 0.3 MG/DL
MICROALBUMIN/CREAT 24H UR-RTO: 1.4 UG/MG (ref ?–30)
MONOCYTES # BLD AUTO: 0.4 X10(3) UL (ref 0.1–1)
MONOCYTES NFR BLD AUTO: 6.2 %
NEUTROPHILS # BLD AUTO: 3.12 X10 (3) UL (ref 1.5–7.7)
NEUTROPHILS # BLD AUTO: 3.12 X10(3) UL (ref 1.5–7.7)
NEUTROPHILS NFR BLD AUTO: 48.3 %
NONHDLC SERPL-MCNC: 106 MG/DL (ref ?–130)
OSMOLALITY SERPL CALC.SUM OF ELEC: 288 MOSM/KG (ref 275–295)
PLATELET # BLD AUTO: 289 10(3)UL (ref 150–450)
POTASSIUM SERPL-SCNC: 4.3 MMOL/L (ref 3.5–5.1)
PROT SERPL-MCNC: 7.5 G/DL (ref 5.7–8.2)
RBC # BLD AUTO: 5.4 X10(6)UL
SODIUM SERPL-SCNC: 138 MMOL/L (ref 136–145)
TRIGL SERPL-MCNC: 156 MG/DL (ref 30–149)
TSI SER-ACNC: 1.72 UIU/ML (ref 0.55–4.78)
VIT D+METAB SERPL-MCNC: 33.2 NG/ML (ref 30–100)
VLDLC SERPL CALC-MCNC: 24 MG/DL (ref 0–30)
WBC # BLD AUTO: 6.5 X10(3) UL (ref 4–11)

## 2025-01-04 PROCEDURE — 82306 VITAMIN D 25 HYDROXY: CPT | Performed by: INTERNAL MEDICINE

## 2025-01-04 PROCEDURE — 80061 LIPID PANEL: CPT | Performed by: INTERNAL MEDICINE

## 2025-01-04 PROCEDURE — 82043 UR ALBUMIN QUANTITATIVE: CPT | Performed by: INTERNAL MEDICINE

## 2025-01-04 PROCEDURE — 83036 HEMOGLOBIN GLYCOSYLATED A1C: CPT | Performed by: INTERNAL MEDICINE

## 2025-01-04 PROCEDURE — 84153 ASSAY OF PSA TOTAL: CPT | Performed by: INTERNAL MEDICINE

## 2025-01-04 PROCEDURE — 80050 GENERAL HEALTH PANEL: CPT | Performed by: INTERNAL MEDICINE

## 2025-01-04 PROCEDURE — 82570 ASSAY OF URINE CREATININE: CPT | Performed by: INTERNAL MEDICINE

## 2025-02-26 ENCOUNTER — OFFICE VISIT (OUTPATIENT)
Dept: ORTHOPEDICS CLINIC | Facility: CLINIC | Age: 50
End: 2025-02-26
Payer: COMMERCIAL

## 2025-02-26 DIAGNOSIS — M94.262 CHONDROMALACIA OF LEFT KNEE: ICD-10-CM

## 2025-02-26 DIAGNOSIS — M65.969 SYNOVITIS OF KNEE: ICD-10-CM

## 2025-02-26 DIAGNOSIS — S83.231A COMPLEX TEAR OF MEDIAL MENISCUS OF RIGHT KNEE AS CURRENT INJURY, INITIAL ENCOUNTER: Primary | ICD-10-CM

## 2025-02-26 PROCEDURE — 99214 OFFICE O/P EST MOD 30 MIN: CPT | Performed by: ORTHOPAEDIC SURGERY

## 2025-02-26 PROCEDURE — 20610 DRAIN/INJ JOINT/BURSA W/O US: CPT | Performed by: ORTHOPAEDIC SURGERY

## 2025-02-26 RX ORDER — TRIAMCINOLONE ACETONIDE 40 MG/ML
40 INJECTION, SUSPENSION INTRA-ARTICULAR; INTRAMUSCULAR ONCE
Status: COMPLETED | OUTPATIENT
Start: 2025-02-26 | End: 2025-02-26

## 2025-02-26 RX ORDER — KETOROLAC TROMETHAMINE 30 MG/ML
30 INJECTION, SOLUTION INTRAMUSCULAR; INTRAVENOUS ONCE
Status: COMPLETED | OUTPATIENT
Start: 2025-02-26 | End: 2025-02-26

## 2025-02-26 RX ADMIN — TRIAMCINOLONE ACETONIDE 40 MG: 40 INJECTION, SUSPENSION INTRA-ARTICULAR; INTRAMUSCULAR at 14:02:00

## 2025-02-26 RX ADMIN — KETOROLAC TROMETHAMINE 30 MG: 30 INJECTION, SOLUTION INTRAMUSCULAR; INTRAVENOUS at 14:02:00

## 2025-02-26 NOTE — PROCEDURES
Right Knee Intra-articular Injection    Name: Hans Simeon   MRN: [id number]  Date: 2/26/2025     Clinical Indications:   Meniscus Tear with symptoms refractory to conservative measures.     After informed consent, the injection site was marked, sterilized with topical chlorhexidine antiseptic, and locally anesthetized with skin refrigerant.    The patient was situation in a comfortable position. Using sterile technique: 1 mL of 30mg/mL of Ketorolac, 2 mL of 0.5% Bupivicaine, 2 mL of 1% Lidocaine, and 1 mL of 40 mg/ml Triamcinolone was injected utilizing anterolateral approach with a 22 gauge needle.  A band-aid was applied.  The patient tolerated the procedure well.        Kyung Dsouza MD  Knee, Shoulder, & Elbow Surgery / Sports Medicine Specialist  Orthopaedic Surgery  [address] [place] [address].org  [email or web address]  t: [phone]  o: [phone]  f: [phone]

## 2025-02-26 NOTE — PROGRESS NOTES
Orthopaedic Surgery  [address] [place] [id number]  [phone]     Name: Hans Simeon   MRN: [id number]  Date: 2/26/2025     REASON FOR VISIT: Right knee pain, recurrent     INTERVAL HISTORY:   Hans Simeon is a very pleasant 49 year old male who returns today with a Meniscus Tear with symptoms refractory to conservative measures. The patient states his pain is rated at a 2-3/10. He was provided with an injection on 9/9/24.     The patient is known to our clinic. He had a left knee arthroscopy partial medial meniscectomy synovectomy on 2/22/24. He is doing well.     PE:   There were no vitals filed for this visit.  Estimated body mass index is 26.64 kg/m² as calculated from the following:    Height as of 12/9/24: 5' 8.5\" (1.74 m).    Weight as of 12/9/24: 177 lb 12.8 oz.    Physical Exam  Constitutional:       Appearance: Normal appearance.   HENT:      Head: Normocephalic and atraumatic.   Eyes:      Extraocular Movements: Extraocular movements intact.   Neck:      Musculoskeletal: Normal range of motion and neck supple.   Cardiovascular:      Pulses: Normal pulses.   Pulmonary:      Effort: Pulmonary effort is normal. No respiratory distress.   Abdominal:      General: There is no distension.   Skin:     General: Skin is warm.      Capillary Refill: Capillary refill takes less than 2 seconds.      Findings: No bruising.   Neurological:      General: No focal deficit present.      Mental Status: She is alert.   Psychiatric:         Mood and Affect: Mood normal.     Examination of the right knee demonstrates:     Skin is intact, warm and dry.   Atrophy: none    Effusion: small    Joint line tenderness: Medial  Crepitation: mild   Dane: Positive   Patellar mobility: normal without apprehension  J-sign: none    ROM: Extension full  Flexion 120 degrees  ACL:  Negative Lachman, Negative Pivot Shift   PCL:  Negative Posterior Drawer  Collateral Ligaments: Stable to Varus and Valgus stress at  0 and 30 degrees  Strength: normal   Hip joint: normal pain-free ROM   Gait:  mildly antalgic   Leg length: equal and symmetric  Alignment:  neutral     No obvious peripheral edema noted.   Distal neurovascular exam demonstrates normal perfusion, intact sensation to light touch and full strength.     Examination of the contralateral knee demonstrates:  No significant atrophy, swelling or effusion. Full range of motion. Neurovascularly intact distally.       Examination of the contralateral knee demonstrates:  No significant atrophy, swelling or effusion. Full range of motion. Neurovascularly intact distally.     Radiographic Examination/Diagnostics:  MRI of the knee personally viewed, independently interpreted and radiology report was reviewed.     PROCEDURE:  MRI KNEE, RIGHT (CPT=73721)  COMPARISON:  [facility] , XR, XR KNEE (3 VIEWS), RIGHT (CPT=73562), 7/26/2024, 8:33 PM.  INDICATIONS:  R29.898 Difficulty bearing weight on right lower extremity M25.469 Knee swelling  TECHNIQUE:  Axial, coronal, and sagittal proton density with and without fat saturation images were obtained.  PATIENT STATED HISTORY: (As transcribed by Technologist)  Patient complains of right knee pain and swelling which began about 1 year ago with no injury or fall  FINDINGS:    LIGAMENTS:The ACL, PCL, patellar retinacula, and collateral ligament complexes are intact.  MENISCI:There is a nondisplaced tear which appears to be longitudinal oblique involving the posterior horn of the medial meniscus extending to the tibial articular surface for approximately 1 cm.  The lateral meniscus is unremarkable.    TENDONS:The tendinous insertions about the knee are intact without significant tendinosis or tears.  MUSCULATURE:No evidence of strain, edema, or atrophy.  BONY COMPARTMENTS: There is full-thickness cartilage tear involving the femoral articular surface measuring 6 mm in length and approximately 2 cm in AP dimension.  Moderate subchondral edema noted  within the medial femoral condyle related to  degenerative change.  There is minimal cartilage thinning involving the lateral compartment and patellofemoral compartment.  SYNOVIUM: There is a small joint effusion without evidence of synovitis. There is no intra-articular body.  CONCLUSION:    1. Nondisplaced longitudinal oblique tear involving the posterior horn of the medial meniscus.  2. Moderate osteoarthritis with the cartilage fissuring involving the medial compartment.  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 Prior injection in September of 2024.      We elected maximize conservative management with repeat Intra-articular corticosteroid and ketorolac injection.      I additionally discussed the role of possible future consideration of knee arthroscopy if deemed necessary.    PLAN:   We reviewed the treatment of this disease condition.  Fortunately, treatment is amenable to conservative treatment which we chose to optimize at today's visit.  After a discussion of a variety of conservative treatment options, I recommended intra-articular injection with corticosteroid and ketorolac to aid in strengthening, range of motion, functional improvement, and return to baseline activity.      We elected to proceed with the injection procedure at today's visit. We discussed the risk and benefits of the procedure; including, but not limited to: infection, injury to blood vessels, nerve injury, prolonged pain, swelling, site soreness, failure to progress, and need for advanced treatments.  The patient voiced understanding and agreed to proceed with the treatment plan.     The patient had the opportunity to ask questions and all questions were answered appropriately.      FOLLOW-UP:    Return to clinic on an as needed basis.         Kyung Dsouza MD  Knee, Shoulder, & Elbow Surgery / Sports Medicine Specialist  Orthopaedic Surgery  [address] [place] [address].org  [email or web address]  t: [phone]  o: [phone]  f: [phone]    This note was dictated using Dragon software.  While it was briefly proofread prior to completion, some grammatical, spelling, and word choice errors due to dictation may still occur.

## 2025-07-01 NOTE — TELEPHONE ENCOUNTER
Requesting    Name from pharmacy: metFORMIN HCl 1000 MG Oral Tablet         Will file in chart as: METFORMIN HCL 1000 MG Oral Tab    Sig: TOME 1 TABLETA POR LA BOCA DOS  VECES AL ALIREZA CON LAS COMIDAS    Disp: 180 tablet    Refills: 3    Start: 6/30/2025    Class: Normal    Non-formulary    To pharmacy: Please send a replace/new response with 90-Day Supply if appropriate to maximize member benefit. Requesting 1 year supply.    Last ordered: 9 months ago (9/5/2024) by Ayah Narayan MD    Last refill: 5/6/2025    Rx #: [id number]    Diabetes Medication Protocol [id number]/30/2025 10:00 PM   Protocol Details In person appointment or virtual visit in the past 6 mos or appointment in next 3 mos    Last A1C < 7.5 and within past 6 months    Microalbumin procedure in past 12 months or taking ACE/ARB    EGFRCR or GFRNAA > 50    GFR in the past 12 months    Medication is active on med list      To be filled at: [facility] - [address] [phone], [phone]     LOV: 12/09/24 w/ DVF  RTC: No Follow Up on File   Last Relevant Labs: 01/04/25   No future appointments.

## 2025-07-09 NOTE — TELEPHONE ENCOUNTER
Pt has scheduled    Future Appointments   Date Time Provider Department Center   9/20/2025  8:00 AM Ayah Urbina MD [facility] [place]

## (undated) NOTE — LETTER
Date & Time: 11/23/2020, 4:16 PM  Patient: Tiana Hurtado  Encounter Provider(s):    Jc Hale MD       To Whom It May Concern:    Tiana Hurtado was seen and treated in our department on 11/23/2020. He may return to work 11/27/2020.     If yo

## (undated) NOTE — LETTER
24      Orthopedic Surgery   Pre-Operative Clearance Request    Patient Name:   Hans Simeon             :   1975    Surgeon: Dr. Dsouza             Date of Surgery: 2024    Surgical Procedure: LEFT KNEE ARTHROSCOPY PARTIAL MEDIAL MENISCECTOMY SYNOVECTOMY ABRASIONPLASTY       Please complete all of the following 2-3 weeks PRIOR TO your scheduled surgery to avoid potential cancellation.     [x]  History and Physical      [x]  Medical  Clearance                               **Please fax test results, H&P, and clearance to [phone] and to [facility] at [phone]**

## (undated) NOTE — LETTER
Date & Time: 7/29/2022, 7:40 PM  Patient: Ramu Franco  Encounter Provider(s):    Polo Tony MD       To Whom It May Concern:    Ramu Franco was seen and treated in our department on 7/29/2022. He can return to work with these limitations: Sitdown duty no standing no pushing or pulling until seen by [facility] [place]. .    If you have any questions or concerns, please do not hesitate to call.        _____________________________  Physician/APC Signature

## (undated) NOTE — LETTER
12/01/20        Bobby nUger  [address] [facility]      Dear Robert Johnson,    [facility] records indicate that you have outstanding lab work and or testing that was ordered for you and has not yet been completed:  Orders Placed This Encount

## (undated) NOTE — LETTER
Date: 8/2/2022    Patient Name: Roxie Sanchez          To Whom it may concern: This letter has been written at the patient's request. The above patient was seen at the [facility] for treatment of a medical condition. This patient should be excused from attending work/school from 7/29/22 through 8/8/22.       Sincerely,      Mercy Pruitt MD

## (undated) NOTE — LETTER
06/01/21        Rosy Rivera  [address] [facility]      Dear Henna Kamara,    [address] [facility] records indicate that you have outstanding lab work and or testing that was ordered for you and has not yet been completed:  Orders Placed This Encount

## (undated) NOTE — LETTER
01/21/22        Enma Chopra  [address] [facility]      Dear Matti Bo,    [address] records indicate that you have outstanding lab work and or testing that was ordered for you and has not yet been completed:  Orders Placed This Encount

## (undated) NOTE — LETTER
07/01/19        Wendy Jada  [address] [facility]      Dear Beronica Vera,    [address] [facility] records indicate that you have outstanding lab work and or testing that was ordered for you and has not yet been completed: Non fasting lab work   To c